# Patient Record
Sex: MALE | Race: ASIAN | NOT HISPANIC OR LATINO | URBAN - METROPOLITAN AREA
[De-identification: names, ages, dates, MRNs, and addresses within clinical notes are randomized per-mention and may not be internally consistent; named-entity substitution may affect disease eponyms.]

---

## 2022-09-20 ENCOUNTER — EMERGENCY (EMERGENCY)
Facility: HOSPITAL | Age: 62
LOS: 0 days | Discharge: HOME | End: 2022-09-21
Attending: STUDENT IN AN ORGANIZED HEALTH CARE EDUCATION/TRAINING PROGRAM
Payer: MEDICAID

## 2022-09-20 VITALS
HEIGHT: 67 IN | HEART RATE: 75 BPM | TEMPERATURE: 98 F | WEIGHT: 160.06 LBS | DIASTOLIC BLOOD PRESSURE: 65 MMHG | RESPIRATION RATE: 16 BRPM | SYSTOLIC BLOOD PRESSURE: 119 MMHG | OXYGEN SATURATION: 96 %

## 2022-09-20 DIAGNOSIS — E11.9 TYPE 2 DIABETES MELLITUS WITHOUT COMPLICATIONS: ICD-10-CM

## 2022-09-20 DIAGNOSIS — E78.5 HYPERLIPIDEMIA, UNSPECIFIED: ICD-10-CM

## 2022-09-20 DIAGNOSIS — R53.1 WEAKNESS: ICD-10-CM

## 2022-09-20 DIAGNOSIS — I10 ESSENTIAL (PRIMARY) HYPERTENSION: ICD-10-CM

## 2022-09-20 DIAGNOSIS — R50.9 FEVER, UNSPECIFIED: ICD-10-CM

## 2022-09-20 DIAGNOSIS — U07.1 COVID-19: ICD-10-CM

## 2022-09-20 DIAGNOSIS — R05.1 ACUTE COUGH: ICD-10-CM

## 2022-09-20 LAB
ALBUMIN SERPL ELPH-MCNC: 3.1 G/DL — LOW (ref 3.3–5)
ALP SERPL-CCNC: 62 U/L — SIGNIFICANT CHANGE UP (ref 40–120)
ALT FLD-CCNC: 20 U/L — SIGNIFICANT CHANGE UP (ref 12–78)
ANION GAP SERPL CALC-SCNC: 7 MMOL/L — SIGNIFICANT CHANGE UP (ref 5–17)
ANISOCYTOSIS BLD QL: SLIGHT — SIGNIFICANT CHANGE UP
APTT BLD: 35.6 SEC — HIGH (ref 27.5–35.5)
AST SERPL-CCNC: 24 U/L — SIGNIFICANT CHANGE UP (ref 15–37)
BASOPHILS # BLD AUTO: 0 K/UL — SIGNIFICANT CHANGE UP (ref 0–0.2)
BASOPHILS NFR BLD AUTO: 0 % — SIGNIFICANT CHANGE UP (ref 0–2)
BILIRUB SERPL-MCNC: 0.6 MG/DL — SIGNIFICANT CHANGE UP (ref 0.2–1.2)
BUN SERPL-MCNC: 37 MG/DL — HIGH (ref 7–23)
CALCIUM SERPL-MCNC: 8.8 MG/DL — SIGNIFICANT CHANGE UP (ref 8.5–10.1)
CHLORIDE SERPL-SCNC: 101 MMOL/L — SIGNIFICANT CHANGE UP (ref 96–108)
CO2 SERPL-SCNC: 27 MMOL/L — SIGNIFICANT CHANGE UP (ref 22–31)
CREAT SERPL-MCNC: 2.28 MG/DL — HIGH (ref 0.5–1.3)
EGFR: 31 ML/MIN/1.73M2 — LOW
EOSINOPHIL # BLD AUTO: 0 K/UL — SIGNIFICANT CHANGE UP (ref 0–0.5)
EOSINOPHIL NFR BLD AUTO: 0 % — SIGNIFICANT CHANGE UP (ref 0–6)
FLUAV AG NPH QL: SIGNIFICANT CHANGE UP
FLUBV AG NPH QL: SIGNIFICANT CHANGE UP
GLUCOSE SERPL-MCNC: 161 MG/DL — HIGH (ref 70–99)
HCT VFR BLD CALC: 37 % — LOW (ref 39–50)
HGB BLD-MCNC: 12.4 G/DL — LOW (ref 13–17)
INR BLD: 1.17 RATIO — HIGH (ref 0.88–1.16)
LACTATE SERPL-SCNC: 1.3 MMOL/L — SIGNIFICANT CHANGE UP (ref 0.7–2)
LYMPHOCYTES # BLD AUTO: 1.98 K/UL — SIGNIFICANT CHANGE UP (ref 1–3.3)
LYMPHOCYTES # BLD AUTO: 21 % — SIGNIFICANT CHANGE UP (ref 13–44)
MANUAL SMEAR VERIFICATION: SIGNIFICANT CHANGE UP
MCHC RBC-ENTMCNC: 28.6 PG — SIGNIFICANT CHANGE UP (ref 27–34)
MCHC RBC-ENTMCNC: 33.5 G/DL — SIGNIFICANT CHANGE UP (ref 32–36)
MCV RBC AUTO: 85.3 FL — SIGNIFICANT CHANGE UP (ref 80–100)
MONOCYTES # BLD AUTO: 1.51 K/UL — HIGH (ref 0–0.9)
MONOCYTES NFR BLD AUTO: 16 % — HIGH (ref 2–14)
NEUTROPHILS # BLD AUTO: 5.48 K/UL — SIGNIFICANT CHANGE UP (ref 1.8–7.4)
NEUTROPHILS NFR BLD AUTO: 50 % — SIGNIFICANT CHANGE UP (ref 43–77)
NEUTS BAND # BLD: 8 % — SIGNIFICANT CHANGE UP (ref 0–8)
NRBC # BLD: 0 /100 — SIGNIFICANT CHANGE UP (ref 0–0)
NRBC # BLD: SIGNIFICANT CHANGE UP /100 WBCS (ref 0–0)
PLAT MORPH BLD: NORMAL — SIGNIFICANT CHANGE UP
PLATELET # BLD AUTO: 182 K/UL — SIGNIFICANT CHANGE UP (ref 150–400)
POTASSIUM SERPL-MCNC: 4.3 MMOL/L — SIGNIFICANT CHANGE UP (ref 3.5–5.3)
POTASSIUM SERPL-SCNC: 4.3 MMOL/L — SIGNIFICANT CHANGE UP (ref 3.5–5.3)
PROT SERPL-MCNC: 7.7 GM/DL — SIGNIFICANT CHANGE UP (ref 6–8.3)
PROTHROM AB SERPL-ACNC: 13.9 SEC — HIGH (ref 10.5–13.4)
RBC # BLD: 4.34 M/UL — SIGNIFICANT CHANGE UP (ref 4.2–5.8)
RBC # FLD: 13.6 % — SIGNIFICANT CHANGE UP (ref 10.3–14.5)
RBC BLD AUTO: ABNORMAL
SARS-COV-2 RNA SPEC QL NAA+PROBE: DETECTED
SODIUM SERPL-SCNC: 135 MMOL/L — SIGNIFICANT CHANGE UP (ref 135–145)
VARIANT LYMPHS # BLD: 5 % — SIGNIFICANT CHANGE UP (ref 0–6)
WBC # BLD: 9.45 K/UL — SIGNIFICANT CHANGE UP (ref 3.8–10.5)
WBC # FLD AUTO: 9.45 K/UL — SIGNIFICANT CHANGE UP (ref 3.8–10.5)

## 2022-09-20 PROCEDURE — 99285 EMERGENCY DEPT VISIT HI MDM: CPT

## 2022-09-20 PROCEDURE — 93970 EXTREMITY STUDY: CPT | Mod: 26

## 2022-09-20 PROCEDURE — 71045 X-RAY EXAM CHEST 1 VIEW: CPT | Mod: 26

## 2022-09-20 PROCEDURE — 93010 ELECTROCARDIOGRAM REPORT: CPT

## 2022-09-20 RX ORDER — SODIUM CHLORIDE 9 MG/ML
1000 INJECTION INTRAMUSCULAR; INTRAVENOUS; SUBCUTANEOUS ONCE
Refills: 0 | Status: COMPLETED | OUTPATIENT
Start: 2022-09-20 | End: 2022-09-20

## 2022-09-20 RX ADMIN — SODIUM CHLORIDE 1000 MILLILITER(S): 9 INJECTION INTRAMUSCULAR; INTRAVENOUS; SUBCUTANEOUS at 22:33

## 2022-09-20 NOTE — ED PROVIDER NOTE - NSICDXPASTMEDICALHX_GEN_ALL_CORE_FT
PAST MEDICAL HISTORY:  DM (diabetes mellitus)     H/O: HTN (hypertension)     HLD (hyperlipidemia)

## 2022-09-20 NOTE — ED ADULT TRIAGE NOTE - CHIEF COMPLAINT QUOTE
Pt c/o pain and tingling for 2 months since he had angiogram on that side Pt states happens after sitting for a long time EMS states he has also had cough and chills none at present

## 2022-09-20 NOTE — ED PROVIDER NOTE - CLINICAL SUMMARY MEDICAL DECISION MAKING FREE TEXT BOX
pt presents today with flu like symptoms x 4 days, found to be covid positive, vitals stable, pt not hypoxic on RA (96%), pt prescribed paxlovid, pts daughter made aware

## 2022-09-20 NOTE — ED ADULT NURSE REASSESSMENT NOTE - NS ED NURSE REASSESS COMMENT FT1
received report from Melony RN. As per Melony, ambulette is scheduled to  pt. endorsed by previous nurse that daughter has been contacted and is available to received pt upon arrival home and aware that pt is discharged.

## 2022-09-20 NOTE — ED PROVIDER NOTE - OBJECTIVE STATEMENT
62 year old male with h/o HTN, DM and HLD presents today c/o four days of cough, subjective fevers, chills and generalized weakness, pts daughter Saran called at [phone] and provided history (pt is Kyrgyz speaking) (-) nausea or vomiting (-) diarrhea (-) chest pain (-) sob +unknown sick contacts, normal appetite

## 2022-09-20 NOTE — ED PROVIDER NOTE - PATIENT PORTAL LINK FT
You can access the FollowMyHealth Patient Portal offered by [facility] by registering at the following website: [email or web address]. By joining [employer]’s FollowMyHealth portal, you will also be able to view your health information using other applications (apps) compatible with our system.

## 2022-09-21 VITALS
SYSTOLIC BLOOD PRESSURE: 147 MMHG | OXYGEN SATURATION: 99 % | RESPIRATION RATE: 18 BRPM | DIASTOLIC BLOOD PRESSURE: 74 MMHG | HEART RATE: 81 BPM

## 2022-09-21 LAB
B CEREUS GROUP DNA BLD POS QL NAA+PROBE: SIGNIFICANT CHANGE UP
GRAM STN FLD: SIGNIFICANT CHANGE UP
METHOD TYPE: SIGNIFICANT CHANGE UP
SPECIMEN SOURCE: SIGNIFICANT CHANGE UP

## 2022-09-22 LAB
CULTURE RESULTS: SIGNIFICANT CHANGE UP
GRAM STN FLD: SIGNIFICANT CHANGE UP
ORGANISM # SPEC MICROSCOPIC CNT: SIGNIFICANT CHANGE UP
ORGANISM # SPEC MICROSCOPIC CNT: SIGNIFICANT CHANGE UP
SPECIMEN SOURCE: SIGNIFICANT CHANGE UP

## 2022-09-25 LAB
CULTURE RESULTS: SIGNIFICANT CHANGE UP
SPECIMEN SOURCE: SIGNIFICANT CHANGE UP

## 2024-05-24 ENCOUNTER — INPATIENT (INPATIENT)
Facility: HOSPITAL | Age: 64
LOS: 4 days | Discharge: HOME HEALTH | DRG: 282 | End: 2024-05-29
Attending: HOSPITALIST | Admitting: HOSPITALIST
Payer: MEDICARE

## 2024-05-24 ENCOUNTER — EMERGENCY (EMERGENCY)
Facility: HOSPITAL | Age: 64
LOS: 0 days | Discharge: ANOTHER HOSPITAL | End: 2024-05-24
Attending: EMERGENCY MEDICINE
Payer: MEDICARE

## 2024-05-24 VITALS
DIASTOLIC BLOOD PRESSURE: 67 MMHG | SYSTOLIC BLOOD PRESSURE: 132 MMHG | OXYGEN SATURATION: 100 % | HEART RATE: 71 BPM | RESPIRATION RATE: 20 BRPM | TEMPERATURE: 98 F

## 2024-05-24 VITALS
HEART RATE: 95 BPM | WEIGHT: 156.09 LBS | TEMPERATURE: 98 F | HEIGHT: 66 IN | SYSTOLIC BLOOD PRESSURE: 125 MMHG | OXYGEN SATURATION: 99 % | DIASTOLIC BLOOD PRESSURE: 63 MMHG | RESPIRATION RATE: 22 BRPM

## 2024-05-24 VITALS
TEMPERATURE: 96 F | SYSTOLIC BLOOD PRESSURE: 144 MMHG | OXYGEN SATURATION: 97 % | RESPIRATION RATE: 24 BRPM | DIASTOLIC BLOOD PRESSURE: 80 MMHG | HEART RATE: 63 BPM

## 2024-05-24 DIAGNOSIS — I21.3 ST ELEVATION (STEMI) MYOCARDIAL INFARCTION OF UNSPECIFIED SITE: ICD-10-CM

## 2024-05-24 DIAGNOSIS — I25.10 ATHEROSCLEROTIC HEART DISEASE OF NATIVE CORONARY ARTERY WITHOUT ANGINA PECTORIS: ICD-10-CM

## 2024-05-24 DIAGNOSIS — R07.89 OTHER CHEST PAIN: ICD-10-CM

## 2024-05-24 DIAGNOSIS — Z98.890 OTHER SPECIFIED POSTPROCEDURAL STATES: Chronic | ICD-10-CM

## 2024-05-24 DIAGNOSIS — R06.02 SHORTNESS OF BREATH: ICD-10-CM

## 2024-05-24 DIAGNOSIS — E78.5 HYPERLIPIDEMIA, UNSPECIFIED: ICD-10-CM

## 2024-05-24 DIAGNOSIS — E11.9 TYPE 2 DIABETES MELLITUS WITHOUT COMPLICATIONS: ICD-10-CM

## 2024-05-24 DIAGNOSIS — I10 ESSENTIAL (PRIMARY) HYPERTENSION: ICD-10-CM

## 2024-05-24 DIAGNOSIS — R09.02 HYPOXEMIA: ICD-10-CM

## 2024-05-24 DIAGNOSIS — Z95.1 PRESENCE OF AORTOCORONARY BYPASS GRAFT: Chronic | ICD-10-CM

## 2024-05-24 DIAGNOSIS — Z88.8 ALLERGY STATUS TO OTHER DRUGS, MEDICAMENTS AND BIOLOGICAL SUBSTANCES STATUS: ICD-10-CM

## 2024-05-24 PROBLEM — Z86.79 PERSONAL HISTORY OF OTHER DISEASES OF THE CIRCULATORY SYSTEM: Chronic | Status: ACTIVE | Noted: 2022-09-20

## 2024-05-24 LAB
A1C WITH ESTIMATED AVERAGE GLUCOSE RESULT: 8.2 % — HIGH (ref 4–5.6)
ACETONE SERPL-MCNC: NEGATIVE — SIGNIFICANT CHANGE UP
ALBUMIN SERPL ELPH-MCNC: 4 G/DL — SIGNIFICANT CHANGE UP (ref 3.3–5)
ALBUMIN SERPL ELPH-MCNC: 4.1 G/DL — SIGNIFICANT CHANGE UP (ref 3.3–5)
ALP SERPL-CCNC: 72 U/L — SIGNIFICANT CHANGE UP (ref 40–120)
ALP SERPL-CCNC: 83 U/L — SIGNIFICANT CHANGE UP (ref 40–120)
ALT FLD-CCNC: 28 U/L — SIGNIFICANT CHANGE UP (ref 10–45)
ALT FLD-CCNC: 29 U/L — SIGNIFICANT CHANGE UP (ref 10–45)
ANION GAP SERPL CALC-SCNC: 13 MMOL/L — SIGNIFICANT CHANGE UP (ref 5–17)
ANION GAP SERPL CALC-SCNC: 14 MMOL/L — SIGNIFICANT CHANGE UP (ref 5–17)
APPEARANCE UR: CLEAR — SIGNIFICANT CHANGE UP
APTT BLD: 34.9 SEC — SIGNIFICANT CHANGE UP (ref 24.5–35.6)
APTT BLD: 55.5 SEC — HIGH (ref 24.5–35.6)
AST SERPL-CCNC: 83 U/L — HIGH (ref 10–40)
AST SERPL-CCNC: 92 U/L — HIGH (ref 10–40)
BASE EXCESS BLDV CALC-SCNC: -4.5 MMOL/L — LOW (ref -2–3)
BASOPHILS # BLD AUTO: 0.02 K/UL — SIGNIFICANT CHANGE UP (ref 0–0.2)
BASOPHILS NFR BLD AUTO: 0.2 % — SIGNIFICANT CHANGE UP (ref 0–2)
BILIRUB SERPL-MCNC: 0.8 MG/DL — SIGNIFICANT CHANGE UP (ref 0.2–1.2)
BILIRUB SERPL-MCNC: 0.8 MG/DL — SIGNIFICANT CHANGE UP (ref 0.2–1.2)
BILIRUB UR-MCNC: NEGATIVE — SIGNIFICANT CHANGE UP
BLD GP AB SCN SERPL QL: NEGATIVE — SIGNIFICANT CHANGE UP
BLOOD GAS COMMENTS, VENOUS: SIGNIFICANT CHANGE UP
BUN SERPL-MCNC: 33 MG/DL — HIGH (ref 7–23)
BUN SERPL-MCNC: 33 MG/DL — HIGH (ref 7–23)
CALCIUM SERPL-MCNC: 10 MG/DL — SIGNIFICANT CHANGE UP (ref 8.4–10.5)
CALCIUM SERPL-MCNC: 10.2 MG/DL — SIGNIFICANT CHANGE UP (ref 8.4–10.5)
CHLORIDE BLDV-SCNC: 101 MMOL/L — SIGNIFICANT CHANGE UP (ref 98–107)
CHLORIDE SERPL-SCNC: 99 MMOL/L — SIGNIFICANT CHANGE UP (ref 96–108)
CHLORIDE SERPL-SCNC: 99 MMOL/L — SIGNIFICANT CHANGE UP (ref 96–108)
CHOLEST SERPL-MCNC: 94 MG/DL — SIGNIFICANT CHANGE UP
CK MB BLD-MCNC: 6.7 % — HIGH (ref 0–3.5)
CK MB CFR SERPL CALC: 41.2 NG/ML — HIGH (ref 0–6.7)
CK SERPL-CCNC: 619 U/L — HIGH (ref 30–200)
CO2 BLDV-SCNC: 22 MMOL/L — SIGNIFICANT CHANGE UP (ref 22–26)
CO2 SERPL-SCNC: 15 MMOL/L — LOW (ref 22–31)
CO2 SERPL-SCNC: 18 MMOL/L — LOW (ref 22–31)
COLOR SPEC: YELLOW — SIGNIFICANT CHANGE UP
CREAT SERPL-MCNC: 1.89 MG/DL — HIGH (ref 0.5–1.3)
CREAT SERPL-MCNC: 1.9 MG/DL — HIGH (ref 0.5–1.3)
DIFF PNL FLD: NEGATIVE — SIGNIFICANT CHANGE UP
EGFR: 38 ML/MIN/1.73M2 — LOW
EGFR: 39 ML/MIN/1.73M2 — LOW
EOSINOPHIL # BLD AUTO: 0.03 K/UL — SIGNIFICANT CHANGE UP (ref 0–0.5)
EOSINOPHIL NFR BLD AUTO: 0.3 % — SIGNIFICANT CHANGE UP (ref 0–6)
ESTIMATED AVERAGE GLUCOSE: 189 MG/DL — HIGH (ref 68–114)
GAS PNL BLDA: SIGNIFICANT CHANGE UP
GAS PNL BLDV: 124 MMOL/L — LOW (ref 136–145)
GAS PNL BLDV: SIGNIFICANT CHANGE UP
GAS PNL BLDV: SIGNIFICANT CHANGE UP
GLUCOSE BLDC GLUCOMTR-MCNC: 165 MG/DL — HIGH (ref 70–99)
GLUCOSE BLDC GLUCOMTR-MCNC: 280 MG/DL — HIGH (ref 70–99)
GLUCOSE BLDC GLUCOMTR-MCNC: 302 MG/DL — HIGH (ref 70–99)
GLUCOSE BLDC GLUCOMTR-MCNC: 353 MG/DL — HIGH (ref 70–99)
GLUCOSE BLDV-MCNC: 403 MG/DL — HIGH (ref 65–95)
GLUCOSE SERPL-MCNC: 270 MG/DL — HIGH (ref 70–99)
GLUCOSE SERPL-MCNC: 349 MG/DL — HIGH (ref 70–99)
GLUCOSE UR QL: 500 MG/DL
HCO3 BLDV-SCNC: 21 MMOL/L — LOW (ref 22–28)
HCT VFR BLD CALC: 38.6 % — LOW (ref 39–50)
HCT VFR BLD CALC: 38.9 % — LOW (ref 39–50)
HCT VFR BLDA CALC: 43 % — SIGNIFICANT CHANGE UP (ref 37–47)
HDLC SERPL-MCNC: 39 MG/DL — LOW
HGB BLD CALC-MCNC: 14.2 G/DL — SIGNIFICANT CHANGE UP (ref 12.6–17.4)
HGB BLD-MCNC: 13.8 G/DL — SIGNIFICANT CHANGE UP (ref 13–17)
HGB BLD-MCNC: 14 G/DL — SIGNIFICANT CHANGE UP (ref 13–17)
HOROWITZ INDEX BLDV+IHG-RTO: 100 — SIGNIFICANT CHANGE UP
IMM GRANULOCYTES NFR BLD AUTO: 0.7 % — SIGNIFICANT CHANGE UP (ref 0–0.9)
INR BLD: 0.86 RATIO — SIGNIFICANT CHANGE UP (ref 0.85–1.18)
KETONES UR-MCNC: NEGATIVE MG/DL — SIGNIFICANT CHANGE UP
LACTATE BLDV-MCNC: 2.4 MMOL/L — HIGH (ref 0.56–1.39)
LEUKOCYTE ESTERASE UR-ACNC: NEGATIVE — SIGNIFICANT CHANGE UP
LIPID PNL WITH DIRECT LDL SERPL: 38 MG/DL — SIGNIFICANT CHANGE UP
LYMPHOCYTES # BLD AUTO: 1.35 K/UL — SIGNIFICANT CHANGE UP (ref 1–3.3)
LYMPHOCYTES # BLD AUTO: 11.7 % — LOW (ref 13–44)
MAGNESIUM SERPL-MCNC: 2.2 MG/DL — SIGNIFICANT CHANGE UP (ref 1.6–2.6)
MAGNESIUM SERPL-MCNC: 2.3 MG/DL — SIGNIFICANT CHANGE UP (ref 1.6–2.6)
MCHC RBC-ENTMCNC: 29.3 PG — SIGNIFICANT CHANGE UP (ref 27–34)
MCHC RBC-ENTMCNC: 29.7 PG — SIGNIFICANT CHANGE UP (ref 27–34)
MCHC RBC-ENTMCNC: 35.8 GM/DL — SIGNIFICANT CHANGE UP (ref 32–36)
MCHC RBC-ENTMCNC: 36 G/DL — SIGNIFICANT CHANGE UP (ref 32–36)
MCV RBC AUTO: 82 FL — SIGNIFICANT CHANGE UP (ref 80–100)
MCV RBC AUTO: 82.4 FL — SIGNIFICANT CHANGE UP (ref 80–100)
MONOCYTES # BLD AUTO: 1.24 K/UL — HIGH (ref 0–0.9)
MONOCYTES NFR BLD AUTO: 10.8 % — SIGNIFICANT CHANGE UP (ref 2–14)
NEUTROPHILS # BLD AUTO: 8.79 K/UL — HIGH (ref 1.8–7.4)
NEUTROPHILS NFR BLD AUTO: 76.3 % — SIGNIFICANT CHANGE UP (ref 43–77)
NITRITE UR-MCNC: NEGATIVE — SIGNIFICANT CHANGE UP
NON HDL CHOLESTEROL: 55 MG/DL — SIGNIFICANT CHANGE UP
NRBC # BLD: 0 /100 WBCS — SIGNIFICANT CHANGE UP (ref 0–0)
NRBC # BLD: 0 /100 WBCS — SIGNIFICANT CHANGE UP (ref 0–0)
NT-PROBNP SERPL-SCNC: 4188 PG/ML — HIGH (ref 0–300)
PCO2 BLDV: 40 MMHG — LOW (ref 42–55)
PH BLDV: 7.33 — SIGNIFICANT CHANGE UP (ref 7.32–7.43)
PH UR: 5 — SIGNIFICANT CHANGE UP (ref 5–8)
PHOSPHATE SERPL-MCNC: 2.8 MG/DL — SIGNIFICANT CHANGE UP (ref 2.5–4.5)
PHOSPHATE SERPL-MCNC: 3.1 MG/DL — SIGNIFICANT CHANGE UP (ref 2.5–4.5)
PLATELET # BLD AUTO: 167 K/UL — SIGNIFICANT CHANGE UP (ref 150–400)
PLATELET # BLD AUTO: 168 K/UL — SIGNIFICANT CHANGE UP (ref 150–400)
PO2 BLDV: 36 MMHG — SIGNIFICANT CHANGE UP (ref 25–45)
POTASSIUM BLDV-SCNC: 6.5 MMOL/L — CRITICAL HIGH (ref 3.5–5.1)
POTASSIUM SERPL-MCNC: 4.6 MMOL/L — SIGNIFICANT CHANGE UP (ref 3.5–5.3)
POTASSIUM SERPL-MCNC: 5.8 MMOL/L — HIGH (ref 3.5–5.3)
POTASSIUM SERPL-SCNC: 4.6 MMOL/L — SIGNIFICANT CHANGE UP (ref 3.5–5.3)
POTASSIUM SERPL-SCNC: 5.8 MMOL/L — HIGH (ref 3.5–5.3)
PROT SERPL-MCNC: 7.8 G/DL — SIGNIFICANT CHANGE UP (ref 6–8.3)
PROT SERPL-MCNC: 8 G/DL — SIGNIFICANT CHANGE UP (ref 6–8.3)
PROT UR-MCNC: SIGNIFICANT CHANGE UP MG/DL
PROTHROM AB SERPL-ACNC: 10.3 SEC — SIGNIFICANT CHANGE UP (ref 9.5–13)
RBC # BLD: 4.71 M/UL — SIGNIFICANT CHANGE UP (ref 4.2–5.8)
RBC # BLD: 4.72 M/UL — SIGNIFICANT CHANGE UP (ref 4.2–5.8)
RBC # FLD: 13 % — SIGNIFICANT CHANGE UP (ref 10.3–14.5)
RBC # FLD: 13.2 % — SIGNIFICANT CHANGE UP (ref 10.3–14.5)
RH IG SCN BLD-IMP: POSITIVE — SIGNIFICANT CHANGE UP
SAO2 % BLDV: 56.9 % — LOW (ref 94–98)
SODIUM SERPL-SCNC: 128 MMOL/L — LOW (ref 135–145)
SODIUM SERPL-SCNC: 130 MMOL/L — LOW (ref 135–145)
SP GR SPEC: 1.01 — SIGNIFICANT CHANGE UP (ref 1–1.03)
TRIGL SERPL-MCNC: 84 MG/DL — SIGNIFICANT CHANGE UP
TROPONIN T, HIGH SENSITIVITY RESULT: 1978 NG/L — HIGH (ref 0–51)
UROBILINOGEN FLD QL: 0.2 MG/DL — SIGNIFICANT CHANGE UP (ref 0.2–1)
WBC # BLD: 11.38 K/UL — HIGH (ref 3.8–10.5)
WBC # BLD: 11.51 K/UL — HIGH (ref 3.8–10.5)
WBC # FLD AUTO: 11.38 K/UL — HIGH (ref 3.8–10.5)
WBC # FLD AUTO: 11.51 K/UL — HIGH (ref 3.8–10.5)

## 2024-05-24 PROCEDURE — 99291 CRITICAL CARE FIRST HOUR: CPT

## 2024-05-24 PROCEDURE — 93308 TTE F-UP OR LMTD: CPT | Mod: 26

## 2024-05-24 PROCEDURE — 93459 L HRT ART/GRFT ANGIO: CPT | Mod: 26

## 2024-05-24 PROCEDURE — 71045 X-RAY EXAM CHEST 1 VIEW: CPT | Mod: 26

## 2024-05-24 PROCEDURE — 99292 CRITICAL CARE ADDL 30 MIN: CPT

## 2024-05-24 PROCEDURE — 93325 DOPPLER ECHO COLOR FLOW MAPG: CPT | Mod: 26

## 2024-05-24 PROCEDURE — 93010 ELECTROCARDIOGRAM REPORT: CPT

## 2024-05-24 PROCEDURE — 71045 X-RAY EXAM CHEST 1 VIEW: CPT | Mod: 26,77

## 2024-05-24 PROCEDURE — 99152 MOD SED SAME PHYS/QHP 5/>YRS: CPT

## 2024-05-24 RX ORDER — GLUCAGON INJECTION, SOLUTION 0.5 MG/.1ML
1 INJECTION, SOLUTION SUBCUTANEOUS ONCE
Refills: 0 | Status: DISCONTINUED | OUTPATIENT
Start: 2024-05-24 | End: 2024-05-24

## 2024-05-24 RX ORDER — FUROSEMIDE 40 MG
40 TABLET ORAL ONCE
Refills: 0 | Status: DISCONTINUED | OUTPATIENT
Start: 2024-05-24 | End: 2024-05-24

## 2024-05-24 RX ORDER — HEPARIN SODIUM 5000 [USP'U]/ML
INJECTION INTRAVENOUS; SUBCUTANEOUS
Qty: 25000 | Refills: 0 | Status: DISCONTINUED | OUTPATIENT
Start: 2024-05-24 | End: 2024-05-24

## 2024-05-24 RX ORDER — DEXTROSE 50 % IN WATER 50 %
25 SYRINGE (ML) INTRAVENOUS ONCE
Refills: 0 | Status: DISCONTINUED | OUTPATIENT
Start: 2024-05-24 | End: 2024-05-24

## 2024-05-24 RX ORDER — TICAGRELOR 90 MG/1
90 TABLET ORAL EVERY 12 HOURS
Refills: 0 | Status: DISCONTINUED | OUTPATIENT
Start: 2024-05-24 | End: 2024-05-24

## 2024-05-24 RX ORDER — ASPIRIN/CALCIUM CARB/MAGNESIUM 324 MG
81 TABLET ORAL DAILY
Refills: 0 | Status: DISCONTINUED | OUTPATIENT
Start: 2024-05-24 | End: 2024-05-24

## 2024-05-24 RX ORDER — FUROSEMIDE 40 MG
60 TABLET ORAL ONCE
Refills: 0 | Status: COMPLETED | OUTPATIENT
Start: 2024-05-24 | End: 2024-05-24

## 2024-05-24 RX ORDER — SODIUM CHLORIDE 9 MG/ML
1000 INJECTION, SOLUTION INTRAVENOUS
Refills: 0 | Status: DISCONTINUED | OUTPATIENT
Start: 2024-05-24 | End: 2024-05-24

## 2024-05-24 RX ORDER — DEXTROSE 50 % IN WATER 50 %
12.5 SYRINGE (ML) INTRAVENOUS ONCE
Refills: 0 | Status: DISCONTINUED | OUTPATIENT
Start: 2024-05-24 | End: 2024-05-24

## 2024-05-24 RX ORDER — TICAGRELOR 90 MG/1
90 TABLET ORAL EVERY 12 HOURS
Refills: 0 | Status: DISCONTINUED | OUTPATIENT
Start: 2024-05-25 | End: 2024-05-29

## 2024-05-24 RX ORDER — DEXTROSE 10 % IN WATER 10 %
125 INTRAVENOUS SOLUTION INTRAVENOUS ONCE
Refills: 0 | Status: DISCONTINUED | OUTPATIENT
Start: 2024-05-24 | End: 2024-05-24

## 2024-05-24 RX ORDER — ATORVASTATIN CALCIUM 80 MG/1
80 TABLET, FILM COATED ORAL AT BEDTIME
Refills: 0 | Status: DISCONTINUED | OUTPATIENT
Start: 2024-05-24 | End: 2024-05-29

## 2024-05-24 RX ORDER — ASPIRIN/CALCIUM CARB/MAGNESIUM 324 MG
81 TABLET ORAL DAILY
Refills: 0 | Status: DISCONTINUED | OUTPATIENT
Start: 2024-05-25 | End: 2024-05-29

## 2024-05-24 RX ORDER — CLOPIDOGREL BISULFATE 75 MG/1
75 TABLET, FILM COATED ORAL DAILY
Refills: 0 | Status: DISCONTINUED | OUTPATIENT
Start: 2024-05-24 | End: 2024-05-24

## 2024-05-24 RX ORDER — HEPARIN SODIUM 5000 [USP'U]/ML
900 INJECTION INTRAVENOUS; SUBCUTANEOUS
Qty: 25000 | Refills: 0 | Status: DISCONTINUED | OUTPATIENT
Start: 2024-05-24 | End: 2024-05-24

## 2024-05-24 RX ORDER — HUMAN INSULIN 100 [IU]/ML
10 INJECTION, SUSPENSION SUBCUTANEOUS ONCE
Refills: 0 | Status: COMPLETED | OUTPATIENT
Start: 2024-05-24 | End: 2024-05-24

## 2024-05-24 RX ORDER — INSULIN LISPRO 100/ML
VIAL (ML) SUBCUTANEOUS EVERY 6 HOURS
Refills: 0 | Status: DISCONTINUED | OUTPATIENT
Start: 2024-05-24 | End: 2024-05-24

## 2024-05-24 RX ORDER — CILOSTAZOL 100 MG/1
100 TABLET ORAL
Refills: 0 | Status: DISCONTINUED | OUTPATIENT
Start: 2024-05-24 | End: 2024-05-26

## 2024-05-24 RX ORDER — HEPARIN SODIUM 5000 [USP'U]/ML
4000 INJECTION INTRAVENOUS; SUBCUTANEOUS EVERY 6 HOURS
Refills: 0 | Status: DISCONTINUED | OUTPATIENT
Start: 2024-05-24 | End: 2024-05-24

## 2024-05-24 RX ORDER — CALCIUM GLUCONATE 100 MG/ML
2 VIAL (ML) INTRAVENOUS ONCE
Refills: 0 | Status: COMPLETED | OUTPATIENT
Start: 2024-05-24 | End: 2024-05-24

## 2024-05-24 RX ORDER — INSULIN LISPRO 100/ML
VIAL (ML) SUBCUTANEOUS
Refills: 0 | Status: DISCONTINUED | OUTPATIENT
Start: 2024-05-24 | End: 2024-05-27

## 2024-05-24 RX ORDER — DEXTROSE 50 % IN WATER 50 %
15 SYRINGE (ML) INTRAVENOUS ONCE
Refills: 0 | Status: DISCONTINUED | OUTPATIENT
Start: 2024-05-24 | End: 2024-05-24

## 2024-05-24 RX ORDER — INSULIN LISPRO 100/ML
VIAL (ML) SUBCUTANEOUS AT BEDTIME
Refills: 0 | Status: DISCONTINUED | OUTPATIENT
Start: 2024-05-24 | End: 2024-05-29

## 2024-05-24 RX ORDER — ASPIRIN/CALCIUM CARB/MAGNESIUM 324 MG
162 TABLET ORAL ONCE
Refills: 0 | Status: COMPLETED | OUTPATIENT
Start: 2024-05-24 | End: 2024-05-24

## 2024-05-24 RX ORDER — FUROSEMIDE 40 MG
40 TABLET ORAL DAILY
Refills: 0 | Status: DISCONTINUED | OUTPATIENT
Start: 2024-05-24 | End: 2024-05-29

## 2024-05-24 RX ORDER — LEVOTHYROXINE SODIUM 125 MCG
100 TABLET ORAL DAILY
Refills: 0 | Status: DISCONTINUED | OUTPATIENT
Start: 2024-05-24 | End: 2024-05-29

## 2024-05-24 RX ORDER — TICAGRELOR 90 MG/1
180 TABLET ORAL ONCE
Refills: 0 | Status: COMPLETED | OUTPATIENT
Start: 2024-05-24 | End: 2024-05-24

## 2024-05-24 RX ORDER — INSULIN GLARGINE 100 [IU]/ML
20 INJECTION, SOLUTION SUBCUTANEOUS AT BEDTIME
Refills: 0 | Status: DISCONTINUED | OUTPATIENT
Start: 2024-05-24 | End: 2024-05-25

## 2024-05-24 RX ORDER — LIDOCAINE HCL 20 MG/ML
10 VIAL (ML) INJECTION ONCE
Refills: 0 | Status: COMPLETED | OUTPATIENT
Start: 2024-05-24 | End: 2024-05-24

## 2024-05-24 RX ORDER — INSULIN LISPRO 100/ML
6 VIAL (ML) SUBCUTANEOUS ONCE
Refills: 0 | Status: COMPLETED | OUTPATIENT
Start: 2024-05-24 | End: 2024-05-24

## 2024-05-24 RX ORDER — HEPARIN SODIUM 5000 [USP'U]/ML
900 INJECTION INTRAVENOUS; SUBCUTANEOUS
Qty: 25000 | Refills: 0 | Status: DISCONTINUED | OUTPATIENT
Start: 2024-05-24 | End: 2024-05-25

## 2024-05-24 RX ADMIN — INSULIN GLARGINE 20 UNIT(S): 100 INJECTION, SOLUTION SUBCUTANEOUS at 22:42

## 2024-05-24 RX ADMIN — Medication 162 MILLIGRAM(S): at 10:16

## 2024-05-24 RX ADMIN — Medication 162 MILLIGRAM(S): at 10:50

## 2024-05-24 RX ADMIN — Medication 60 MILLIGRAM(S): at 12:32

## 2024-05-24 RX ADMIN — HEPARIN SODIUM 9 UNIT(S)/HR: 5000 INJECTION INTRAVENOUS; SUBCUTANEOUS at 13:46

## 2024-05-24 RX ADMIN — Medication 3: at 22:42

## 2024-05-24 RX ADMIN — HUMAN INSULIN 10 UNIT(S): 100 INJECTION, SUSPENSION SUBCUTANEOUS at 13:16

## 2024-05-24 RX ADMIN — TICAGRELOR 90 MILLIGRAM(S): 90 TABLET ORAL at 17:38

## 2024-05-24 RX ADMIN — ATORVASTATIN CALCIUM 80 MILLIGRAM(S): 80 TABLET, FILM COATED ORAL at 22:42

## 2024-05-24 RX ADMIN — Medication 6 UNIT(S): at 12:32

## 2024-05-24 RX ADMIN — TICAGRELOR 180 MILLIGRAM(S): 90 TABLET ORAL at 10:38

## 2024-05-24 RX ADMIN — CILOSTAZOL 100 MILLIGRAM(S): 100 TABLET ORAL at 17:39

## 2024-05-24 RX ADMIN — HEPARIN SODIUM 900 UNIT(S)/HR: 5000 INJECTION INTRAVENOUS; SUBCUTANEOUS at 10:41

## 2024-05-24 RX ADMIN — Medication 200 GRAM(S): at 10:50

## 2024-05-24 RX ADMIN — Medication 5 MILLILITER(S): at 22:43

## 2024-05-24 RX ADMIN — Medication 1: at 17:36

## 2024-05-24 NOTE — ED ADULT NURSE NOTE - NSFALLUNIVINTERV_ED_ALL_ED
Bed/Stretcher in lowest position, wheels locked, appropriate side rails in place/Call bell, personal items and telephone in reach/Instruct patient to call for assistance before getting out of bed/chair/stretcher/Non-slip footwear applied when patient is off stretcher/[place] to call system/Physically safe environment - no spills, clutter or unnecessary equipment/Purposeful proactive rounding/Room/bathroom lighting operational, light cord in reach

## 2024-05-24 NOTE — PROGRESS NOTE ADULT - ASSESSMENT
63y/o M PMHx CABG in 2018, PCI x3 prior to 2018, CKD3, HTN, DM, HLD, presenting with chest pain and shortness of breathing. EKG showed STEMI pathology, patient transferred to cath lab for C, unable to tolerate lying flat. Patient transferred to CICU for further monitoring.     NEURO:  #Mental status: baseline   -Currently A&O x 3    CV:  #STEMI (+EKG changes): Typical/atypical symptoms, loaded with 325 mg ASA, 180mg Ticagrelor, heparin bolus   - Revascularization (within 48hrs of symptoms) with PCI, unable to lie flat. Will optimize respiratory status and reach out to interventional cardiology for reattempt   - DAPT: ASA 81, Ticagrelor 90mg BID   - Statin: atorvastatin 80mg qD   - Heparin gtt   - TTE 5/24:   - Obtain cardiac enzymes     PULM:  #Pulmonary edema   Patient appears volume overloaded   - Aggressive diuresis with Lasix     RENAL:  #TRANG: Elevated Cr near baseline of 2.2   - Place lu   - Monitor I/Os     #Hyperkalemia   - Shift as needed, trial Lokelma when able to tolerate off BiPAP     GI:  #Diet: NPO while on BiPAP. Trial weaning onto HFNC when able     #Elevated AST   Likely 2/2 HF   - Continue to monitor     ENDO:  #DM2: Send HbA1c   - Insulin Sliding Scale   - Lantus 20u qHS     HEMATOLOGIC:  #DVT prophylaxis with heparin gtt     #Mild leukocytosis   Likely 2/2 ACS and heart failure   - Monitor off abx   - Continue to trend     ID:  #Pt without strong objective or clinical evidence of infection. Will observe off antibiotics     SKIN:  #Lines: Lu (5/24-)   Peripherals     #R foot nail falling off   - Podiatry c/s when patient more stable      65y/o M PMHx CABG in 2018, PCI x3 prior to 2018, CKD3, HTN, DM, HLD, presenting with chest pain and shortness of breathing. EKG showed STEMI pathology, patient transferred to cath lab for LHC, unable to tolerate lying flat. Patient transferred to CICU for further monitoring.     ====NEURO====  #Mental status: baseline   -Currently A&O x 3    ====PULM====  #Pulmonary edema   Patient appears volume overloaded   - Aggressive diuresis with Lasix   - Respiratory status improved, weaned off of BiPAP    ====CARDIOVASCULAR====  #STEMI (+EKG changes): Typical/atypical symptoms, loaded with 325 mg ASA, 180mg Ticagrelor, heparin bolus   - Revascularization (within 48hrs of symptoms) with PCI, unable to lie flat. Will optimize respiratory status and reach out to interventional cardiology for reattempt   - DAPT: ASA 81, Ticagrelor 90mg BID   - LHC: patent grafts, OM occlusion  - Statin: atorvastatin 80mg qD   - Heparin gtt   - TTE 5/24: EF 59%, no RWMAs, nml RVSF    ====RENAL====  #TRANG: Elevated Cr near baseline of 2.2   - Place lu   - Monitor I/Os   - Avoid nephrotoxic medications    #Hyperkalemia   - Shift as needed, trial Lokelma when able to tolerate off BiPAP     ====GI====  #Diet: NPO while on BiPAP. Trial weaning onto HFNC when able     #Elevated AST   Likely 2/2 HF   - Continue to monitor     ====ENDO====  #DM2: Send HbA1c   - Insulin Sliding Scale   - Lantus 20u qHS     ====HEME/ONC===  #DVT prophylaxis with heparin gtt   - Transition to SQH  - H/H and platelets stable    ====ID====  #Mild leukocytosis   Likely 2/2 ACS and heart failure   - Monitor off abx   - Continue to trend     ====LINES====   Lu (5/24-)   Peripherals     #R foot nail falling off   - Podiatry c/s when patient more stable     ======================= DISPOSITION  =====================  [X] Critical   [ ] Guarded    [ ] Stable    [X] Maintain in CICU  [ ] Downgrade to Telemtry  [ ] Discharge Home    Patient requires continuous monitoring with bedside rhythm monitoring, pulse ox monitoring, and intermittent blood gas analysis. Care plan discussed with ICU care team. Patient remained critical and at risk for life threatening decompensation.  Patient seen, examined and plan discussed with CCU team during rounds.     I have personally provided 35 minutes of critical care time excluding time spent on separate procedures, in addition to initial critical care time provided by the CICU Attending, Dr. Le

## 2024-05-24 NOTE — H&P ADULT - NSICDXPASTMEDICALHX_GEN_ALL_CORE_FT
PAST MEDICAL HISTORY:  CAD (coronary artery disease)     HTN (hypertension)     Hyperlipidemia     PAD (peripheral artery disease)     Stage 3 chronic kidney disease     Type 2 diabetes mellitus

## 2024-05-24 NOTE — ED ADULT NURSE REASSESSMENT NOTE - NS ED NURSE REASSESS COMMENT FT1
STEMI noted on EKG.  immediately made aware. 2nd large bore IV access obtained. Heparin infusion and aspirin/brilinta given to pt as ordered. Maintained on cardiac monitor

## 2024-05-24 NOTE — ED PROVIDER NOTE - CLINICAL SUMMARY MEDICAL DECISION MAKING FREE TEXT BOX
Attending note (Hunter): Six 4-year-old male history HTN DM HLD and possible heart failure is on typically Lasix 40 mg daily presenting with worsening chest pain shortness of breath this morning after awakening brought in by EMS and CPAP.  Patient with diffuse rales and wheezing and appearing volume overloaded.  EKG showing evidence of ST elevation MI.  Decision for transfer made and case discussed with cardiology attending at [facility], accepted for transfer starting on heparin drip continuing BiPAP.

## 2024-05-24 NOTE — H&P ADULT - ASSESSMENT
65y/o M with h/o HTN, DM, HLD, presenting with chest pain and shortness of breathing. EKG showed STEMI pathology, patient transferred to cath lab for [facility], unable to tolerate lying flat. Patient transferred to CICU for further monitoring.     NEURO:  #Mental status: baseline   -Currently A&O x 3    CV:  #STEMI (+EKG changes): Typical/atypical symptoms, loaded with 325 mg ASA, 180mg Ticagrelor, heparin bolus   - Revascularization (within 48hrs of symptoms) with PCI   - DAPT: ASA 81, Ticagrelor 90mg BID for 1 year   - Statin: atorvastatin 80mg qD   - Heparin gtt   - Stat TTE       PULM:  #Pulmonary edema   Patient appears volume overloaded   - Diuresis with Lasix     RENAL:  #TRANG: Elevated Cr   - Place lu   - Urine studies     #Hyperkalemia   - Shift as needed, trial Lokelma when able to tolerate off BiPAP     GI:  #Diet: NPO while on BiPAP     ENDO:  #DM2: Send HbA1c   - Insulin Sliding Scale      HEMATOLOGIC:  #DVT prophylaxis with heparin gtt     ID:  #Pt without strong objective or clinical evidence of infection. Will observe off antibiotics    SKIN:  #Lines:         63y/o M PMHx CABG in 2018, PCI x3 prior to 2018, CKD3, HTN, DM, HLD, presenting with chest pain and shortness of breathing. EKG showed STEMI pathology, patient transferred to cath lab for C, unable to tolerate lying flat. Patient transferred to CICU for further monitoring.     NEURO:  #Mental status: baseline   -Currently A&O x 3    CV:  #STEMI (+EKG changes): Typical/atypical symptoms, loaded with 325 mg ASA, 180mg Ticagrelor, heparin bolus   - Revascularization (within 48hrs of symptoms) with PCI, unable to lie flat. Will optimize respiratory status and reach out to interventional cardiology for reattempt   - DAPT: ASA 81, Ticagrelor 90mg BID for 1 year   - Statin: atorvastatin 80mg qD   - Heparin gtt   - Stat TTE   - Obtain cardiac enzymes     PULM:  #Pulmonary edema   Patient appears volume overloaded   - Aggressive diuresis with Lasix     RENAL:  #TRANG: Elevated Cr near baseline of 2.2   - Place lu   - Monitor I/Os     #Hyperkalemia   - Shift as needed, trial Lokelma when able to tolerate off BiPAP     GI:  #Diet: NPO while on BiPAP. Trial weaning onto HFNC when able     #Elevated AST   Likely 2/2 HF   - Continue to monitor     ENDO:  #DM2: Send HbA1c   - Insulin Sliding Scale   - Lantus 20u qHS     HEMATOLOGIC:  #DVT prophylaxis with heparin gtt     #Mild leukocytosis   Likely 2/2 ACS and heart failure   - Monitor off abx   - Continue to trend     ID:  #Pt without strong objective or clinical evidence of infection. Will observe off antibiotics     SKIN:  #Lines: Lu (5/24-)   Peripherals    63y/o M PMHx CABG in 2018, PCI x3 prior to 2018, CKD3, HTN, DM, HLD, presenting with chest pain and shortness of breathing. EKG showed STEMI pathology, patient transferred to cath lab for C, unable to tolerate lying flat. Patient transferred to CICU for further monitoring.     NEURO:  #Mental status: baseline   -Currently A&O x 3    CV:  #STEMI (+EKG changes): Typical/atypical symptoms, loaded with 325 mg ASA, 180mg Ticagrelor, heparin bolus   - Revascularization (within 48hrs of symptoms) with PCI, unable to lie flat. Will optimize respiratory status and reach out to interventional cardiology for reattempt   - DAPT: ASA 81, Ticagrelor 90mg BID for 1 year   - Statin: atorvastatin 80mg qD   - Heparin gtt   - Stat TTE   - Obtain cardiac enzymes     PULM:  #Pulmonary edema   Patient appears volume overloaded   - Aggressive diuresis with Lasix     RENAL:  #TRANG: Elevated Cr near baseline of 2.2   - Place lu   - Monitor I/Os     #Hyperkalemia   - Shift as needed, trial Lokelma when able to tolerate off BiPAP     GI:  #Diet: NPO while on BiPAP. Trial weaning onto HFNC when able     #Elevated AST   Likely 2/2 HF   - Continue to monitor     ENDO:  #DM2: Send HbA1c   - Insulin Sliding Scale   - Lantus 20u qHS     HEMATOLOGIC:  #DVT prophylaxis with heparin gtt     #Mild leukocytosis   Likely 2/2 ACS and heart failure   - Monitor off abx   - Continue to trend     ID:  #Pt without strong objective or clinical evidence of infection. Will observe off antibiotics     SKIN:  #Lines: Lu (5/24-)   Peripherals     #R foot nail falling off   - Podiatry c/s when patient more stable

## 2024-05-24 NOTE — ED ADULT NURSE NOTE - OBJECTIVE STATEMENT
64 yr old male AOx4. C/o difficulty breathing since last night. BIBA on CPAP. 100% O2 on CPAP. Reports being unable to obtain lasix and insulin from pharmacy x4 days. B/L leg edema noted. Lungs sounds diminished. Reports improvement in breathing on CPAP. Reports chronic 5/10 midsternal CP since bypass surgery in 2018. PMH of DM, HTN, HLD, CAD, and CHF. Resp called to prepare BIPAP for pt 64 yr old male AOx4. C/o difficulty breathing since last night. BIBA on CPAP. 100% O2 on CPAP. Reports being unable to obtain lasix and insulin from pharmacy x4 days. B/L leg edema noted. Lungs sounds diminished. Reports improvement in breathing on CPAP. Reports chronic 5/10 midsternal CP since bypass surgery in 2018. PMH of DM, HTN, HLD, CAD, and CHF. Resp called to prepare BIPAP for pt. Pt is Latvian speaking, denies  services, daughter at bedside acting as

## 2024-05-24 NOTE — H&P ADULT - NSHPPHYSICALEXAM_GEN_ALL_CORE
VITALS:   T(C): 35.4 (05-24-24 @ 11:48), Max: 35.4 (05-24-24 @ 11:48)  HR: 63 (05-24-24 @ 11:48) (63 - 63)  BP: 144/80 (05-24-24 @ 11:48) (144/80 - 144/80)  RR: 24 (05-24-24 @ 11:48) (24 - 24)  SpO2: 97% (05-24-24 @ 11:48) (97% - 97%)    GENERAL: NAD, lying in bed comfortably  HEAD:  Atraumatic, normocephalic  EYES: EOMI, PERRLA, conjunctiva and sclera clear  ENT: Moist mucous membranes  NECK: Supple, no JVD  HEART: Regular rate and rhythm, no murmurs, rubs, or gallops  LUNGS: Unlabored respirations.  Clear to auscultation bilaterally, no crackles, wheezing, or rhonchi  ABDOMEN: Soft, nontender, nondistended, +BS  EXTREMITIES: 2+ peripheral pulses bilaterally. No clubbing, cyanosis, or edema  NERVOUS SYSTEM:  A&Ox3, no focal deficits   SKIN: No rashes or lesions VITALS:   T(C): 35.4 (05-24-24 @ 11:48), Max: 35.4 (05-24-24 @ 11:48)  HR: 63 (05-24-24 @ 11:48) (63 - 63)  BP: 144/80 (05-24-24 @ 11:48) (144/80 - 144/80)  RR: 24 (05-24-24 @ 11:48) (24 - 24)  SpO2: 97% (05-24-24 @ 11:48) (97% - 97%)    GENERAL: NAD, lying in bed comfortably  HEAD:  Atraumatic, normocephalic  EYES: EOMI, PERRLA, conjunctiva and sclera clear  ENT: Moist mucous membranes  NECK: Supple, no JVD  HEART: Regular rate and rhythm, no murmurs, rubs, or gallops  LUNGS: Unlabored respirations.  Clear to auscultation bilaterally, no crackles, wheezing, or rhonchi  ABDOMEN: Soft, nontender, nondistended   EXTREMITIES: 2+ peripheral pulses bilaterally. + B/l LE edema  NERVOUS SYSTEM:  A&Ox3, no focal deficits   SKIN: No rashes or lesions

## 2024-05-24 NOTE — H&P ADULT - HISTORY OF PRESENT ILLNESS
65y/o M with h/o HTN, DM, HLD, presenting with chest pain and shortness of breathing. EMS started patient on CPAP for hypoxia and increased WOB, and gave sublingual nitroglycerin x 2 and IV push lasix 60mg. Per ED note, patient with diffuse rales and wheezing and appearing volume overloaded. Patient received ASA, Brilinta, started on heparin gtt, and given 2g calcium due to elevated K+ on VBG. Patient transferred to [facility] for cardiac catheterization.   In cath lab, patient was evaluated, unable to tolerate lying flat and unable to be off BiPAP due to desaturation and tachycardia. Patient then transferred to CICU.   In CICU, patient alert, responsive, on BiPAP. Fs found to be >360, given 6u Admelog.   In [facility], labs notable for ProBNP > ~3800 63y/o M PMHx CABG in 2018, PCI x3 prior to 2018, CKD3, HTN, DM, HLD, presenting with chest pain and shortness of breathing. Patient BIBEMS to [facility]. EMS started patient on CPAP for hypoxia and increased WOB, and gave sublingual nitroglycerin x 2 and IV push lasix 60mg. Per ED note, patient with diffuse rales and wheezing and appearing volume overloaded. Patient received ASA, Brilinta, started on heparin gtt, and given 2g calcium due to elevated K+ on VBG. Patient transferred to [facility] for cardiac catheterization.   In cath lab, patient was evaluated, unable to tolerate lying flat and unable to be off BiPAP due to desaturation and tachycardia. Patient then transferred to CICU.   In CICU, patient alert, responsive, on BiPAP. Fs found to be >360, given 6u Admelog.   In [facility], labs notable for ProBNP > ~3800, K+ 6.5, AST 78, Cr 2.24, Lact 2.4. CXR shows evidence of pulmonary edema.

## 2024-05-24 NOTE — PATIENT PROFILE ADULT - FALL HARM RISK - HARM RISK INTERVENTIONS

## 2024-05-24 NOTE — PROGRESS NOTE ADULT - SUBJECTIVE AND OBJECTIVE BOX
DAQUAN WEST  MRN-[id number]  Patient is a 64y old  Male who presents with a chief complaint of STEMI (24 May 2024 12:13)    HPI:  65y/o M PMHx CABG in 2018, PCI x3 prior to 2018, CKD3, HTN, DM, HLD, presenting with chest pain and shortness of breathing. Patient BIBEMS to [facility]. EMS started patient on CPAP for hypoxia and increased WOB, and gave sublingual nitroglycerin x 2 and IV push lasix 60mg. Per ED note, patient with diffuse rales and wheezing and appearing volume overloaded. Patient received ASA, Brilinta, started on heparin gtt, and given 2g calcium due to elevated K+ on VBG. Patient transferred to [facility] for cardiac catheterization.   In cath lab, patient was evaluated, unable to tolerate lying flat and unable to be off BiPAP due to desaturation and tachycardia. Patient then transferred to CICU.   In CICU, patient alert, responsive, on BiPAP. Fs found to be >360, given 6u Admelog.   In [facility], labs notable for ProBNP > ~3800, K+ 6.5, AST 78, Cr 2.24, Lact 2.4. CXR shows evidence of pulmonary edema.  (24 May 2024 12:13)      24 HOUR EVENTS:    REVIEW OF SYSTEMS:    CONSTITUTIONAL: No weakness, fevers or chills  EYES/ENT: No visual changes;  No vertigo or throat pain   NECK: No pain or stiffness  RESPIRATORY: No cough, wheezing, hemoptysis; No shortness of breath  CARDIOVASCULAR: No chest pain or palpitations  GASTROINTESTINAL: No abdominal or epigastric pain. No nausea, vomiting, or hematemesis; No diarrhea or constipation. No melena or hematochezia.  GENITOURINARY: No dysuria, frequency or hematuria  NEUROLOGICAL: No numbness or weakness  SKIN: No itching, rashes      ICU Vital Signs Last 24 Hrs  T(C): 37.1 (24 May 2024 19:00), Max: 38 (24 May 2024 18:00)  T(F): 98.8 (24 May 2024 19:00), Max: 100.4 (24 May 2024 18:00)  HR: 76 (24 May 2024 19:00) (53 - 95)  BP: 136/64 (24 May 2024 19:00) (125/63 - 195/81)  BP(mean): 92 (24 May 2024 19:00) (92 - 116)  ABP: --  ABP(mean): --  RR: 20 (24 May 2024 19:00) (14 - 25)  SpO2: 100% (24 May 2024 19:00) (93% - 100%)    O2 Parameters below as of 24 May 2024 19:00  Patient On (Oxygen Delivery Method): room air            CVP(mm Hg): --  CO: --  CI: --  PA: --  PA(mean): --  PA(direct): --  PCWP: --  LA: --  RA: --  SVR: --  SVRI: --  PVR: --  PVRI: --  I&O's Summary    24 May 2024 07:01  -  24 May 2024 19:49  --------------------------------------------------------  IN: 156 mL / OUT: 3740 mL / NET: -3584 mL        CAPILLARY BLOOD GLUCOSE    CAPILLARY BLOOD GLUCOSE      POCT Blood Glucose.: 165 mg/dL (24 May 2024 17:17)      PHYSICAL EXAM:  GENERAL: No acute distress, well-developed  HEAD:  Atraumatic, Normocephalic  EYES: EOMI, PERRLA, conjunctiva and sclera clear  NECK: Supple, no lymphadenopathy, no JVD  CHEST/LUNG: CTAB; No wheezes, rales, or rhonchi  HEART: Regular rate and rhythm. Normal S1/S2. No murmurs, rubs, or gallops  ABDOMEN: Soft, non-tender, non-distended; normal bowel sounds, no organomegaly  EXTREMITIES:  2+ peripheral pulses b/l, No clubbing, cyanosis, or edema  NEUROLOGY: A&O x 3, no focal deficits  SKIN: No rashes or lesions    ============================I/O===========================   I&O's Detail    24 May 2024 07:01  -  24 May 2024 19:49  --------------------------------------------------------  IN:    Heparin: 36 mL    Oral Fluid: 120 mL  Total IN: 156 mL    OUT:    Indwelling Catheter - Urethral (mL): 3740 mL  Total OUT: 3740 mL    Total NET: -3584 mL        ============================ LABS =========================                        13.8   11.38 )-----------( 168      ( 24 May 2024 12:31 )             38.6     05-24    130<L>  |  99  |  33<H>  ----------------------------<  270<H>  4.6   |  18<L>  |  1.90<H>    Ca    10.0      24 May 2024 14:58  Phos  3.1     05-24  Mg     2.2     05-24    TPro  7.8  /  Alb  4.0  /  TBili  0.8  /  DBili  x   /  AST  92<H>  /  ALT  28  /  AlkPhos  72  05-24    Troponin T, High Sensitivity Result: 1978 ng/L (05-24-24 @ 12:31)    CKMB Units: 41.2 ng/mL (05-24-24 @ 12:31)    Creatine Kinase, Serum: 619 U/L (05-24-24 @ 12:31)    CPK Mass Assay %: 6.7 % (05-24-24 @ 12:31)        LIVER FUNCTIONS - ( 24 May 2024 14:58 )  Alb: 4.0 g/dL / Pro: 7.8 g/dL / ALK PHOS: 72 U/L / ALT: 28 U/L / AST: 92 U/L / GGT: x           PT/INR - ( 24 May 2024 10:30 )   PT: 10.3 sec;   INR: 0.86 ratio         PTT - ( 24 May 2024 13:45 )  PTT:55.5 sec  ABG - ( 24 May 2024 12:09 )  pH, Arterial: 7.32  pH, Blood: x     /  pCO2: 33    /  pO2: 74    / HCO3: 17    / Base Excess: -8.1  /  SaO2: 94.2              Blood Gas Arterial, Lactate: 2.1 mmol/L (05-24-24 @ 12:09)  Blood Gas Venous - Lactate: 2.40 mmol/L (05-24-24 @ 10:39)    Urinalysis Basic - ( 24 May 2024 14:58 )    Color: x / Appearance: x / SG: x / pH: x  Gluc: 270 mg/dL / Ketone: x  / Bili: x / Urobili: x   Blood: x / Protein: x / Nitrite: x   Leuk Esterase: x / RBC: x / WBC x   Sq Epi: x / Non Sq Epi: x / Bacteria: x      ======================Micro/Rad/Cardio=================  Telemetry: Reviewed   EKG: Reviewed  CXR: Reviewed  Culture: Reviewed   Echo:   Cath:           DAQUAN WEST  MRN-[id number]  Patient is a 64y old  Male who presents with a chief complaint of STEMI (24 May 2024 12:13)    HPI:  65y/o M PMHx CABG in 2018, PCI x3 prior to 2018, CKD3, HTN, DM, HLD, presenting with chest pain and shortness of breathing. Patient BIBEMS to [facility]. EMS started patient on CPAP for hypoxia and increased WOB, and gave sublingual nitroglycerin x 2 and IV push lasix 60mg. Per ED note, patient with diffuse rales and wheezing and appearing volume overloaded. Patient received ASA, Brilinta, started on heparin gtt, and given 2g calcium due to elevated K+ on VBG. Patient transferred to [facility] for cardiac catheterization.   In cath lab, patient was evaluated, unable to tolerate lying flat and unable to be off BiPAP due to desaturation and tachycardia. Patient then transferred to CICU.   In CICU, patient alert, responsive, on BiPAP. Fs found to be >360, given 6u Admelog.   In [facility], labs notable for ProBNP > ~3800, K+ 6.5, AST 78, Cr 2.24, Lact 2.4. CXR shows evidence of pulmonary edema.  (24 May 2024 12:13)      24 HOUR EVENTS:  - S/p LHC with no intervention  - Diuresed with lasix  - Transitioned off bipap    REVIEW OF SYSTEMS:  CONSTITUTIONAL: No weakness, fevers or chills  EYES/ENT: No visual changes;  No vertigo or throat pain   NECK: No pain or stiffness  RESPIRATORY: No cough, wheezing, hemoptysis; No shortness of breath  CARDIOVASCULAR: No chest pain or palpitations  GASTROINTESTINAL: No abdominal or epigastric pain. No nausea, vomiting, or hematemesis; No diarrhea or constipation. No melena or hematochezia.  GENITOURINARY: No dysuria, frequency or hematuria  NEUROLOGICAL: No numbness or weakness  SKIN: No itching, rashes      ICU Vital Signs Last 24 Hrs  T(C): 37.1 (24 May 2024 19:00), Max: 38 (24 May 2024 18:00)  T(F): 98.8 (24 May 2024 19:00), Max: 100.4 (24 May 2024 18:00)  HR: 76 (24 May 2024 19:00) (53 - 95)  BP: 136/64 (24 May 2024 19:00) (125/63 - 195/81)  BP(mean): 92 (24 May 2024 19:00) (92 - 116)  ABP: --  ABP(mean): --  RR: 20 (24 May 2024 19:00) (14 - 25)  SpO2: 100% (24 May 2024 19:00) (93% - 100%)    O2 Parameters below as of 24 May 2024 19:00  Patient On (Oxygen Delivery Method): room air              I&O's Summary    24 May 2024 07:01  -  24 May 2024 19:49  --------------------------------------------------------  IN: 156 mL / OUT: 3740 mL / NET: -3584 mL          POCT Blood Glucose.: 165 mg/dL (24 May 2024 17:17)      PHYSICAL EXAM:  GENERAL: No acute distress, well-developed  HEAD:  Atraumatic, Normocephalic  EYES: EOMI, PERRLA, conjunctiva and sclera clear  NECK: Supple, no lymphadenopathy, no JVD  CHEST/LUNG: CTAB; No wheezes, rales, or rhonchi  HEART: Regular rate and rhythm. Normal S1/S2. No murmurs, rubs, or gallops  ABDOMEN: Soft, non-tender, non-distended; normal bowel sounds, no organomegaly  EXTREMITIES:  2+ peripheral pulses b/l, No clubbing, cyanosis, or edema. RF sheath removal site soft, nontender, no evidence of hematoma  NEUROLOGY: A&O x 3, no focal deficits  SKIN: No rashes or lesions    ============================I/O===========================   I&O's Detail    24 May 2024 07:01  -  24 May 2024 19:49  --------------------------------------------------------  IN:    Heparin: 36 mL    Oral Fluid: 120 mL  Total IN: 156 mL    OUT:    Indwelling Catheter - Urethral (mL): 3740 mL  Total OUT: 3740 mL    Total NET: -3584 mL        ============================ LABS =========================                        13.8   11.38 )-----------( 168      ( 24 May 2024 12:31 )             38.6     05-24    130<L>  |  99  |  33<H>  ----------------------------<  270<H>  4.6   |  18<L>  |  1.90<H>    Ca    10.0      24 May 2024 14:58  Phos  3.1     05-24  Mg     2.2     05-24    TPro  7.8  /  Alb  4.0  /  TBili  0.8  /  DBili  x   /  AST  92<H>  /  ALT  28  /  AlkPhos  72  05-24    Troponin T, High Sensitivity Result: 1978 ng/L (05-24-24 @ 12:31)    CKMB Units: 41.2 ng/mL (05-24-24 @ 12:31)    Creatine Kinase, Serum: 619 U/L (05-24-24 @ 12:31)    CPK Mass Assay %: 6.7 % (05-24-24 @ 12:31)        LIVER FUNCTIONS - ( 24 May 2024 14:58 )  Alb: 4.0 g/dL / Pro: 7.8 g/dL / ALK PHOS: 72 U/L / ALT: 28 U/L / AST: 92 U/L / GGT: x           PT/INR - ( 24 May 2024 10:30 )   PT: 10.3 sec;   INR: 0.86 ratio         PTT - ( 24 May 2024 13:45 )  PTT:55.5 sec  ABG - ( 24 May 2024 12:09 )  pH, Arterial: 7.32  pH, Blood: x     /  pCO2: 33    /  pO2: 74    / HCO3: 17    / Base Excess: -8.1  /  SaO2: 94.2              Blood Gas Arterial, Lactate: 2.1 mmol/L (05-24-24 @ 12:09)  Blood Gas Venous - Lactate: 2.40 mmol/L (05-24-24 @ 10:39)    Urinalysis Basic - ( 24 May 2024 14:58 )    Color: x / Appearance: x / SG: x / pH: x  Gluc: 270 mg/dL / Ketone: x  / Bili: x / Urobili: x   Blood: x / Protein: x / Nitrite: x   Leuk Esterase: x / RBC: x / WBC x   Sq Epi: x / Non Sq Epi: x / Bacteria: x      ======================Micro/Rad/Cardio=================  Telemetry: Reviewed   EKG: Reviewed  CXR: Reviewed  Culture: Reviewed   Echo:   Cath:

## 2024-05-24 NOTE — ED PROVIDER NOTE - PHYSICAL EXAMINATION
On Physical Exam:  General: ill appearing, tachypneic but is awake/alert, speaking  HEENT: PERRL, MMM  Neck: no neck tenderness, no nuchal rigidity  Cardiac: s1s2 regular  Lungs: diffuse wheezing/rales, + increased work of breathing (improved when changed to bipap)  Abdomen: soft nontender/nondistended  Skin: intact, no rash  Extremities: 2+ bilateral lower extremity pitting peripheral edema, no gross deformities

## 2024-05-24 NOTE — ED ADULT TRIAGE NOTE - CHIEF COMPLAINT QUOTE
BIBA from home for shortness of breath around 0700, arrived on cpap, as per emt, noted rales, ran out of lasix 40mg and insulin 4 days ago, was given 0.4mg nitro spray and 60mg lasix ivp by emt  hx of chf

## 2024-05-24 NOTE — ED PROVIDER NOTE - OBJECTIVE STATEMENT
Attending note (Hunter): 65y/o M with h/o HTN, DM, HLD, presenting with chest pain and shortness of breathing; worsening since last night; reportedly on lasix previously, but has not had any home medications (ran out) for past ~3 days.  Felt unwell last night; this morning awoke with chest tightness, back tightness and difficulty breathing. EMs started patietn on CPAP for hypoxia and increased WOB, and gave nitroglycerin s.l. x 2 and ivp lasix 60mg.  Patient Irish speaking; family translating at his/their request.

## 2024-05-24 NOTE — H&P ADULT - NSICDXFAMILYHX_GEN_ALL_CORE_FT
FAMILY HISTORY:  Sibling  Still living? Unknown  FH: kidney disease, Age at diagnosis: Age Unknown

## 2024-05-24 NOTE — ED PROVIDER NOTE - PROGRESS NOTE DETAILS
Attending note (Hunter): case discussed with rj Dewitt for cardiology/?catheterization Attending note (Hunter): VBG lab obtained potassium 6.5 suspect likely hemolyzed however out of abundance of caution given EKG changes will give calcium 2 g Attending note (Hunter): VBG lab obtained potassium 6.5 suspect likely hemolyzed however out of abundance of caution given EKG changes will give calcium 2 g; EMS here for transport getting calcium in and then will allow transport.

## 2024-05-24 NOTE — ED PROVIDER NOTE - CARE PLAN
Consultation - 126 [facility]  Leslie Hodge 38 y o  male MRN: [id number]  Unit/Bed#: -01 Encounter: [id number]        Consults    Reason for Consult / Principal Problem:     Anemia, liver disease      ASSESSMENT AND PLAN:      Patient has severe anemia microcytosis high RDW, though there is no overt GI blood loss but any ongoing GI blood loss remains a diagnosis of exclusion  His MCV was normal couple years ago so I doubt it is thalassemia  He has received blood transfusion, tentative plans for EGD tomorrow to check for portal hypertensive gastropathy and/or varices or other source of possible blood loss  If no active GI bleeding, then can be re-evaluated for possible alcoholic hepatitis and or use of steroids  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, patient well aware of fatal complications of advanced liver disease  Continue aggressive diuresis with IV diuretics and albumin infusion  Check ultrasound  Management of encephalopathy as being done  Monitor for alcohol withdrawal/ delirium tremens  Alcoholism/alcohol dependence and other medical problems  Patient well aware of fatal complications of advanced liver disease  Discussed with Dr Werner Nyhan as well   ______________________________________________________________________    HPI:      Patient was admitted with confusion, evaluation revealed alcohol level of 448 elevated ammonia at 98 and severe anemia hemoglobin of 3 6  Since then he has does see blood transfusion, and is seems fairly alert awake appropriate at the time of my evaluation  Patient complains of some swelling of his whole body, feels very weak fatigued, denies any abdominal pain nausea vomiting dysphagia  He also denies any melena hematochezia hematemesis blood in the urine sometimes though stools are dark  He denies any known history of ulcer disease varices or and or upper GI bleeding    He does not recall any prior EGD/colonoscopy either  Denies taking excessive aspirin NSAIDs  Does consume a bottle of vodka every day, lives at home with his parents  He is a   He denies any chest pains, feels weak tired fatigued, no history of CVA seizures CAD stents pacemakers  Diet medications more than 10 pertinent systems reviewed  He tells me he has gone to be a [place], was referred to [place] in the [place], he was also being to the hospital here may be year ago with advanced liver disease, he has been to rehab couple times, has any advised to stop drinking alcohol but he has not  REVIEW OF SYSTEMS:    CONSTITUTIONAL: Denies any fever, chills, rigors, and weight loss  HEENT: No earache or tinnitus  Denies hearing loss or visual disturbances  CARDIOVASCULAR: No chest pain or palpitations  RESPIRATORY: Denies any cough, hemoptysis, shortness of breath or dyspnea on exertion  GASTROINTESTINAL: As noted in the History of Present Illness  GENITOURINARY: No problems with urination  Denies any hematuria or dysuria  NEUROLOGIC: No dizziness or vertigo, denies headaches  MUSCULOSKELETAL: Denies any muscle or joint pain  SKIN: Denies skin rashes or itching  ENDOCRINE: Denies excessive thirst  Denies intolerance to heat or cold  PSYCHOSOCIAL: Denies depression or anxiety  Denies any recent memory loss         Historical Information   Past Medical History:   Diagnosis Date    AAA (abdominal aortic aneurysm) ([facility] )     Allergic     Anemia     Asthma     Depression 05/18/2021    Essential hypertension 05/06/2019    Obesity     Opiate dependence ([facility] )     Substance abuse ([place] [id number] )      Past Surgical History:   Procedure Laterality Date    LYMPH NODE BIOPSY       Social History   Social History     Substance and Sexual Activity   Alcohol Use Yes    Alcohol/week: 12 0 standard drinks    Types: 12 Cans of beer per week     Social History     Substance and Sexual Activity   Drug Use Not Currently    Types: Heroin Comment: 7/16/22 recovery     Social History     Tobacco Use   Smoking Status Current Every Day Smoker    Packs/day: 1 00   Smokeless Tobacco Never Used   Tobacco Comment    2/2019; PATIENT VAPES     Family History   Problem Relation Age of Onset    No Known Problems Mother     Diabetes Father     Prostate cancer Father     Hypertension Father        Meds/Allergies     Medications Prior to Admission   Medication    albuterol (Ventolin HFA) 90 mcg/act inhaler    buprenorphine-naloxone (SUBOXONE) 8-2 mg per SL tablet    escitalopram (LEXAPRO) 20 mg tablet    zolpidem (AMBIEN CR) 6 25 MG CR tablet    ammonium lactate (LAC-HYDRIN) 12 % lotion    ferrous sulfate 324 (65 Fe) mg    folic acid (FOLVITE) 1 mg tablet    furosemide (LASIX) 40 mg tablet    lactulose (CHRONULAC) 10 g/15 mL solution    nicotine (NICODERM CQ) 14 mg/24hr TD 24 hr patch    pantoprazole (PROTONIX) 40 mg tablet    spironolactone (ALDACTONE) 50 mg tablet     Current Facility-Administered Medications   Medication Dose Route Frequency    acetaminophen (TYLENOL) tablet 650 mg  650 mg Oral Q6H PRN    albumin human (FLEXBUMIN) 25 % injection 25 g  25 g Intravenous TID    albuterol (PROVENTIL HFA,VENTOLIN HFA) inhaler 2 puff  2 puff Inhalation Q6H PRN    buprenorphine-naloxone (Suboxone) film 8 mg  8 mg Sublingual BID    escitalopram (LEXAPRO) tablet 20 mg  20 mg Oral Daily    ferrous sulfate tablet 325 mg  325 mg Oral Daily With Breakfast    folic acid (FOLVITE) tablet 1 mg  1 mg Oral Daily    furosemide (LASIX) injection 40 mg  40 mg Intravenous BID (diuretic)    lactulose oral solution 20 g  20 g Oral BID    LORazepam (ATIVAN) tablet 1 mg  1 mg Oral Q8H PRN    midodrine (PROAMATINE) tablet 10 mg  10 mg Oral TID AC    multivitamin-minerals (CENTRUM) tablet 1 tablet  1 tablet Oral Daily    nicotine (NICODERM CQ) 21 mg/24 hr TD 24 hr patch 1 patch  1 patch Transdermal Daily    oxazepam (SERAX) capsule 10 mg  10 mg Oral Q8H [address]  pantoprazole (PROTONIX) EC tablet 40 mg  40 mg Oral Early Morning    potassium chloride (K-DUR,KLOR-CON) CR tablet 40 mEq  40 mEq Oral TID With Meals    spironolactone (ALDACTONE) tablet 50 mg  50 mg Oral Daily    thiamine tablet 100 mg  100 mg Oral Daily    trimethobenzamide (TIGAN) IM injection 200 mg  200 mg Intramuscular Q6H PRN       Allergies   Allergen Reactions    Levofloxacin Other (See Comments)     BLACKED OUT           Objective     Blood pressure 141/71, pulse (!) 115, temperature (!) 101 5 °F (38 6 °C), temperature source Tympanic, resp  rate 16, height 6' 1" (1 854 m), weight 96 6 kg (212 lb 15 4 oz), SpO2 95 %  Body mass index is 28 1 kg/m²  Intake/Output Summary (Last 24 hours) at 7/17/2022 1500  Last data filed at 7/17/2022 1347  Gross per 24 hour   Intake 1770 ml   Output 1250 ml   Net 520 ml         PHYSICAL EXAM:      General Appearance:   Alert, cooperative, no distress   HEENT:   Normocephalic, atraumatic, anicteric      Neck:  Supple, symmetrical, trachea midline   Lungs:   Clear to auscultation bilaterally; no rales, rhonchi or wheezing; respirations unlabored    Heart[de-identified]   Regular rate and rhythm; no murmur, rub, or gallop     Abdomen:   Soft, non-tender, non-distended; normal bowel sounds; no masses, no organomegaly    Genitalia:   Deferred    Rectal:   Deferred    Extremities:  No cyanosis, clubbing or edema    Pulses:  2+ and symmetric all extremities    Skin:  No jaundice, rashes, or lesions    Lymph nodes:  No palpable cervical lymphadenopathy        Lab Results:   Admission on 07/16/2022   Component Date Value    WBC 07/16/2022 6 09     RBC 07/16/2022 2 15 (A)    Hemoglobin 07/16/2022 3 6 (A)    Hematocrit 07/16/2022 13 7 (A)    MCV 07/16/2022 64 (A)    MCH 07/16/2022 16 7 (A)    MCHC 07/16/2022 26 3 (A)    RDW 07/16/2022 30 8 (A)    MPV 07/16/2022 8 5 (A)    Platelets 48/06/9167 140 (A)    nRBC 07/16/2022 3     Neutrophils Relative 07/16/2022 66     Immat GRANS % 07/16/2022 2     Lymphocytes Relative 07/16/2022 23     Monocytes Relative 07/16/2022 9     Eosinophils Relative 07/16/2022 0     Basophils Relative 07/16/2022 0     Neutrophils Absolute 07/16/2022 3 99     Immature Grans Absolute 07/16/2022 0 12     Lymphocytes Absolute 07/16/2022 1 42     Monocytes Absolute 07/16/2022 0 53     Eosinophils Absolute 07/16/2022 0 01     Basophils Absolute 07/16/2022 0 02     Sodium 07/16/2022 133 (A)    Potassium 07/16/2022 4 0     Chloride 07/16/2022 94 (A)    CO2 07/16/2022 28     ANION GAP 07/16/2022 11     BUN 07/16/2022 8     Creatinine 07/16/2022 0 32 (A)    Glucose 07/16/2022 118     Calcium 07/16/2022 8 1 (A)    Corrected Calcium 07/16/2022 8 7     AST 07/16/2022 128 (A)    ALT 07/16/2022 29     Alkaline Phosphatase 07/16/2022 221 (A)    Total Protein 07/16/2022 7 6     Albumin 07/16/2022 3 3 (A)    Total Bilirubin 07/16/2022 4 61 (A)    eGFR 07/16/2022 166     Lipase 07/16/2022 50     hs TnI 0hr 07/16/2022 6     Ethanol Lvl 07/16/2022 448 (A)    Ammonia 07/16/2022 98 (A)    Protime 07/16/2022 21 1 (A)    INR 07/16/2022 1 87 (A)    PTT 07/16/2022 45 (A)    Amph/Meth UR 07/16/2022 Negative     Barbiturate Ur 07/16/2022 Negative     Benzodiazepine Urine 07/16/2022 Negative     Cocaine Urine 07/16/2022 Negative     Methadone Urine 07/16/2022 Negative     Opiate Urine 07/16/2022 Negative     PCP Ur 07/16/2022 Negative     THC Urine 07/16/2022 Negative     Oxycodone Urine 07/16/2022 Negative     Phosphorus 07/16/2022 2 6 (A)    Magnesium 07/16/2022 2 0     Total CK 07/16/2022 40     OCCULT BLD, FECAL IMMUNO* 07/16/2022 Negative     hs TnI 2hr 07/16/2022 6     Delta 2hr hsTnI 07/16/2022 0     Unit Product Code 07/17/2022 [id number]     Unit Number 07/17/2022 [id number]     Unit ABO 07/17/2022 A     Unit [facility] 07/17/2022 POS     Crossmatch 07/17/2022 Compatible     Unit Dispense Status 07/17/2022 Presumed Trans     Unit Product Volume 07/17/2022 350     Unit Product Code 07/17/2022 [id number]     Unit Number 07/17/2022 [id number]     Unit ABO 07/17/2022 A     Unit RH 07/17/2022 POS     Crossmatch 07/17/2022 Compatible     Unit Dispense Status 07/17/2022 Presumed Trans     Unit Product Volume 07/17/2022 350     ABO Grouping 07/16/2022 A     Rh Factor 07/16/2022 Positive     Antibody Screen 07/16/2022 Negative     Specimen Expiration Date 07/16/2022 [id number]     AFP TUMOR MARKER 07/16/2022 3 5     Folate 07/16/2022 3 7     Vitamin B-12 07/16/2022 1,422 (A)    Bilirubin, Direct 07/16/2022 2 58 (A)    Iron Saturation 07/16/2022 4 (A)    TIBC 07/16/2022 342     Iron 07/16/2022 13 (A)    Ferritin 07/16/2022 34     Unit Product Code 07/17/2022 [id number]     Unit Number 07/17/2022 [id number]     Unit ABO 07/17/2022 A     Unit [facility] 07/17/2022 POS     Crossmatch 07/17/2022 Compatible     Unit Dispense Status 07/17/2022 Presumed Trans     Unit Product Volume 07/17/2022 350     Sodium 07/17/2022 135     Potassium 07/17/2022 2 7 (A)    Chloride 07/17/2022 94 (A)    CO2 07/17/2022 29     ANION GAP 07/17/2022 12     BUN 07/17/2022 7     Creatinine 07/17/2022 0 35 (A)    Glucose 07/17/2022 123     Calcium 07/17/2022 7 9 (A)    Corrected Calcium 07/17/2022 8 5     AST 07/17/2022 100 (A)    ALT 07/17/2022 26     Alkaline Phosphatase 07/17/2022 194 (A)    Total Protein 07/17/2022 7 1     Albumin 07/17/2022 3 2 (A)    Total Bilirubin 07/17/2022 5 47 (A)    eGFR 07/17/2022 160     Magnesium 07/17/2022 1 6 (A)    Phosphorus 07/17/2022 2 6 (A)    WBC 07/17/2022 4 91     RBC 07/17/2022 2 40 (A)    Hemoglobin 07/17/2022 4 7 (A)    Hematocrit 07/17/2022 16 1 (A)    MCV 07/17/2022 67 (A)    MCH 07/17/2022 19 6 (A)    MCHC 07/17/2022 29 2 (A)    RDW 07/17/2022 30 5 (A)    Platelets 75/52/2002 101 (A)    Unit Product Code 07/17/2022 [id number]     Unit Number 07/17/2022 [id number]     Unit ABO 07/17/2022 A     Unit RH 07/17/2022 POS     Crossmatch 07/17/2022 Compatible     Unit Dispense Status 07/17/2022 Issued     Unit Product Volume 07/17/2022 350     LD 07/17/2022 234     Hemoglobin 07/17/2022 5 4 (A)    Hematocrit 07/17/2022 17 8 (A)    Retic Ct Abs 07/17/2022 <10,000 (A)    Retic Ct Pct 07/17/2022 0 39     Segmented Neutrophils Ma* 07/17/2022 71     Bands Manual 07/17/2022 1     Lymphocytes Manual 07/17/2022 21     Monocytes Manual 07/17/2022 6     Basos 07/17/2022 1     Total Counted 07/17/2022 100     nRBC 07/17/2022 5 (A)    RBC Morphology 07/17/2022 Present     Hypochromia 07/17/2022 Present     Poikilocytes 07/17/2022 Present     Polychromasia 07/17/2022 Present     Elliptocytes 07/17/2022 Present     Platelet Estimate 68/08/1318 Decreased (A)    Sodium 07/17/2022 134 (A)    Potassium 07/17/2022 3 3 (A)    Chloride 07/17/2022 94 (A)    CO2 07/17/2022 29     ANION GAP 07/17/2022 11     BUN 07/17/2022 6     Creatinine 07/17/2022 0 38 (A)    Glucose 07/17/2022 121     Calcium 07/17/2022 8 5     Corrected Calcium 07/17/2022 9 1     AST 07/17/2022 103 (A)    ALT 07/17/2022 26     Alkaline Phosphatase 07/17/2022 201 (A)    Total Protein 07/17/2022 7 2     Albumin 07/17/2022 3 3 (A)    Total Bilirubin 07/17/2022 6 66 (A)    eGFR 07/17/2022 154     Magnesium 07/17/2022 2 2     Phosphorus 07/17/2022 2 3 (A)    Unit Product Code 07/17/2022 [id number]     Unit Number 07/17/2022 [id number]     Unit ABO 07/17/2022 A     Unit [facility] 07/17/2022 POS     Crossmatch 07/17/2022 Compatible     Unit Dispense Status 07/17/2022 Crossmatched     Unit Product Volume 07/17/2022 350        Imaging Studies: I have personally reviewed pertinent imaging studies  Principal Discharge DX:	STEMI (ST elevation myocardial infarction)   1

## 2024-05-25 DIAGNOSIS — E11.9 TYPE 2 DIABETES MELLITUS WITHOUT COMPLICATIONS: ICD-10-CM

## 2024-05-25 DIAGNOSIS — H40.9 UNSPECIFIED GLAUCOMA: ICD-10-CM

## 2024-05-25 DIAGNOSIS — I10 ESSENTIAL (PRIMARY) HYPERTENSION: ICD-10-CM

## 2024-05-25 DIAGNOSIS — I21.3 ST ELEVATION (STEMI) MYOCARDIAL INFARCTION OF UNSPECIFIED SITE: ICD-10-CM

## 2024-05-25 DIAGNOSIS — Z95.1 PRESENCE OF AORTOCORONARY BYPASS GRAFT: ICD-10-CM

## 2024-05-25 DIAGNOSIS — N18.30 CHRONIC KIDNEY DISEASE, STAGE 3 UNSPECIFIED: ICD-10-CM

## 2024-05-25 DIAGNOSIS — E78.5 HYPERLIPIDEMIA, UNSPECIFIED: ICD-10-CM

## 2024-05-25 DIAGNOSIS — Z29.9 ENCOUNTER FOR PROPHYLACTIC MEASURES, UNSPECIFIED: ICD-10-CM

## 2024-05-25 LAB
A1C WITH ESTIMATED AVERAGE GLUCOSE RESULT: 8.4 % — HIGH (ref 4–5.6)
ALBUMIN SERPL ELPH-MCNC: 3.3 G/DL — SIGNIFICANT CHANGE UP (ref 3.3–5)
ALP SERPL-CCNC: 73 U/L — SIGNIFICANT CHANGE UP (ref 40–120)
ALT FLD-CCNC: 23 U/L — SIGNIFICANT CHANGE UP (ref 10–45)
ANION GAP SERPL CALC-SCNC: 14 MMOL/L — SIGNIFICANT CHANGE UP (ref 5–17)
APTT BLD: 79.7 SEC — HIGH (ref 24.5–35.6)
AST SERPL-CCNC: 65 U/L — HIGH (ref 10–40)
BILIRUB SERPL-MCNC: 1.1 MG/DL — SIGNIFICANT CHANGE UP (ref 0.2–1.2)
BUN SERPL-MCNC: 31 MG/DL — HIGH (ref 7–23)
CALCIUM SERPL-MCNC: 9.6 MG/DL — SIGNIFICANT CHANGE UP (ref 8.4–10.5)
CHLORIDE SERPL-SCNC: 95 MMOL/L — LOW (ref 96–108)
CO2 SERPL-SCNC: 21 MMOL/L — LOW (ref 22–31)
CREAT SERPL-MCNC: 1.91 MG/DL — HIGH (ref 0.5–1.3)
EGFR: 38 ML/MIN/1.73M2 — LOW
ESTIMATED AVERAGE GLUCOSE: 194 MG/DL — HIGH (ref 68–114)
GLUCOSE BLDC GLUCOMTR-MCNC: 161 MG/DL — HIGH (ref 70–99)
GLUCOSE BLDC GLUCOMTR-MCNC: 168 MG/DL — HIGH (ref 70–99)
GLUCOSE BLDC GLUCOMTR-MCNC: 286 MG/DL — HIGH (ref 70–99)
GLUCOSE BLDC GLUCOMTR-MCNC: 313 MG/DL — HIGH (ref 70–99)
GLUCOSE BLDC GLUCOMTR-MCNC: 374 MG/DL — HIGH (ref 70–99)
GLUCOSE BLDC GLUCOMTR-MCNC: 387 MG/DL — HIGH (ref 70–99)
GLUCOSE SERPL-MCNC: 421 MG/DL — HIGH (ref 70–99)
HCT VFR BLD CALC: 36.6 % — LOW (ref 39–50)
HGB BLD-MCNC: 12.8 G/DL — LOW (ref 13–17)
MAGNESIUM SERPL-MCNC: 1.9 MG/DL — SIGNIFICANT CHANGE UP (ref 1.6–2.6)
MCHC RBC-ENTMCNC: 29 PG — SIGNIFICANT CHANGE UP (ref 27–34)
MCHC RBC-ENTMCNC: 35 GM/DL — SIGNIFICANT CHANGE UP (ref 32–36)
MCV RBC AUTO: 82.8 FL — SIGNIFICANT CHANGE UP (ref 80–100)
NRBC # BLD: 0 /100 WBCS — SIGNIFICANT CHANGE UP (ref 0–0)
PHOSPHATE SERPL-MCNC: 3.7 MG/DL — SIGNIFICANT CHANGE UP (ref 2.5–4.5)
PLATELET # BLD AUTO: 161 K/UL — SIGNIFICANT CHANGE UP (ref 150–400)
POTASSIUM SERPL-MCNC: 4 MMOL/L — SIGNIFICANT CHANGE UP (ref 3.5–5.3)
POTASSIUM SERPL-SCNC: 4 MMOL/L — SIGNIFICANT CHANGE UP (ref 3.5–5.3)
PROT SERPL-MCNC: 7 G/DL — SIGNIFICANT CHANGE UP (ref 6–8.3)
RBC # BLD: 4.42 M/UL — SIGNIFICANT CHANGE UP (ref 4.2–5.8)
RBC # FLD: 12.8 % — SIGNIFICANT CHANGE UP (ref 10.3–14.5)
SODIUM SERPL-SCNC: 130 MMOL/L — LOW (ref 135–145)
TSH SERPL-MCNC: 3.72 UIU/ML — SIGNIFICANT CHANGE UP (ref 0.27–4.2)
WBC # BLD: 10.21 K/UL — SIGNIFICANT CHANGE UP (ref 3.8–10.5)
WBC # FLD AUTO: 10.21 K/UL — SIGNIFICANT CHANGE UP (ref 3.8–10.5)

## 2024-05-25 PROCEDURE — 99223 1ST HOSP IP/OBS HIGH 75: CPT | Mod: GC

## 2024-05-25 PROCEDURE — 99291 CRITICAL CARE FIRST HOUR: CPT | Mod: 25

## 2024-05-25 PROCEDURE — 93010 ELECTROCARDIOGRAM REPORT: CPT

## 2024-05-25 RX ORDER — HUMAN INSULIN 100 [IU]/ML
15 INJECTION, SUSPENSION SUBCUTANEOUS ONCE
Refills: 0 | Status: DISCONTINUED | OUTPATIENT
Start: 2024-05-25 | End: 2024-05-25

## 2024-05-25 RX ORDER — LANOLIN ALCOHOL/MO/W.PET/CERES
5 CREAM (GRAM) TOPICAL AT BEDTIME
Refills: 0 | Status: DISCONTINUED | OUTPATIENT
Start: 2024-05-25 | End: 2024-05-29

## 2024-05-25 RX ORDER — MAGNESIUM SULFATE 500 MG/ML
1 VIAL (ML) INJECTION ONCE
Refills: 0 | Status: COMPLETED | OUTPATIENT
Start: 2024-05-25 | End: 2024-05-25

## 2024-05-25 RX ORDER — CARVEDILOL PHOSPHATE 80 MG/1
6.25 CAPSULE, EXTENDED RELEASE ORAL EVERY 12 HOURS
Refills: 0 | Status: DISCONTINUED | OUTPATIENT
Start: 2024-05-25 | End: 2024-05-29

## 2024-05-25 RX ORDER — INSULIN LISPRO 100/ML
10 VIAL (ML) SUBCUTANEOUS ONCE
Refills: 0 | Status: DISCONTINUED | OUTPATIENT
Start: 2024-05-25 | End: 2024-05-25

## 2024-05-25 RX ORDER — GABAPENTIN 400 MG/1
300 CAPSULE ORAL DAILY
Refills: 0 | Status: DISCONTINUED | OUTPATIENT
Start: 2024-05-25 | End: 2024-05-29

## 2024-05-25 RX ORDER — DORZOLAMIDE HYDROCHLORIDE TIMOLOL MALEATE 20; 5 MG/ML; MG/ML
1 SOLUTION/ DROPS OPHTHALMIC
Refills: 0 | Status: DISCONTINUED | OUTPATIENT
Start: 2024-05-25 | End: 2024-05-29

## 2024-05-25 RX ORDER — INSULIN LISPRO 100/ML
5 VIAL (ML) SUBCUTANEOUS ONCE
Refills: 0 | Status: COMPLETED | OUTPATIENT
Start: 2024-05-25 | End: 2024-05-25

## 2024-05-25 RX ORDER — ACETAMINOPHEN 500 MG
650 TABLET ORAL EVERY 6 HOURS
Refills: 0 | Status: DISCONTINUED | OUTPATIENT
Start: 2024-05-25 | End: 2024-05-29

## 2024-05-25 RX ORDER — LATANOPROST 0.05 MG/ML
1 SOLUTION/ DROPS OPHTHALMIC; TOPICAL AT BEDTIME
Refills: 0 | Status: DISCONTINUED | OUTPATIENT
Start: 2024-05-25 | End: 2024-05-29

## 2024-05-25 RX ORDER — INSULIN GLARGINE 100 [IU]/ML
30 INJECTION, SOLUTION SUBCUTANEOUS AT BEDTIME
Refills: 0 | Status: DISCONTINUED | OUTPATIENT
Start: 2024-05-25 | End: 2024-05-26

## 2024-05-25 RX ORDER — HEPARIN SODIUM 5000 [USP'U]/ML
5000 INJECTION INTRAVENOUS; SUBCUTANEOUS EVERY 8 HOURS
Refills: 0 | Status: DISCONTINUED | OUTPATIENT
Start: 2024-05-25 | End: 2024-05-27

## 2024-05-25 RX ORDER — INSULIN LISPRO 100/ML
10 VIAL (ML) SUBCUTANEOUS
Refills: 0 | Status: DISCONTINUED | OUTPATIENT
Start: 2024-05-25 | End: 2024-05-27

## 2024-05-25 RX ADMIN — HEPARIN SODIUM 9 UNIT(S)/HR: 5000 INJECTION INTRAVENOUS; SUBCUTANEOUS at 00:18

## 2024-05-25 RX ADMIN — HUMAN INSULIN 15 UNIT(S): 100 INJECTION, SUSPENSION SUBCUTANEOUS at 08:50

## 2024-05-25 RX ADMIN — Medication 100 GRAM(S): at 06:38

## 2024-05-25 RX ADMIN — CARVEDILOL PHOSPHATE 6.25 MILLIGRAM(S): 80 CAPSULE, EXTENDED RELEASE ORAL at 17:27

## 2024-05-25 RX ADMIN — DORZOLAMIDE HYDROCHLORIDE TIMOLOL MALEATE 1 DROP(S): 20; 5 SOLUTION/ DROPS OPHTHALMIC at 17:24

## 2024-05-25 RX ADMIN — Medication 100 MICROGRAM(S): at 06:38

## 2024-05-25 RX ADMIN — Medication 10 UNIT(S): at 12:17

## 2024-05-25 RX ADMIN — Medication 4: at 11:39

## 2024-05-25 RX ADMIN — TICAGRELOR 90 MILLIGRAM(S): 90 TABLET ORAL at 17:26

## 2024-05-25 RX ADMIN — CARVEDILOL PHOSPHATE 6.25 MILLIGRAM(S): 80 CAPSULE, EXTENDED RELEASE ORAL at 08:58

## 2024-05-25 RX ADMIN — Medication 5: at 08:31

## 2024-05-25 RX ADMIN — HEPARIN SODIUM 9 UNIT(S)/HR: 5000 INJECTION INTRAVENOUS; SUBCUTANEOUS at 08:32

## 2024-05-25 RX ADMIN — LATANOPROST 1 DROP(S): 0.05 SOLUTION/ DROPS OPHTHALMIC; TOPICAL at 22:05

## 2024-05-25 RX ADMIN — GABAPENTIN 300 MILLIGRAM(S): 400 CAPSULE ORAL at 22:11

## 2024-05-25 RX ADMIN — Medication 5 MILLIGRAM(S): at 22:05

## 2024-05-25 RX ADMIN — ATORVASTATIN CALCIUM 80 MILLIGRAM(S): 80 TABLET, FILM COATED ORAL at 22:05

## 2024-05-25 RX ADMIN — Medication 10 UNIT(S): at 17:27

## 2024-05-25 RX ADMIN — Medication 81 MILLIGRAM(S): at 11:39

## 2024-05-25 RX ADMIN — Medication 5 MILLIGRAM(S): at 01:30

## 2024-05-25 RX ADMIN — Medication 1: at 17:27

## 2024-05-25 RX ADMIN — TICAGRELOR 90 MILLIGRAM(S): 90 TABLET ORAL at 06:38

## 2024-05-25 RX ADMIN — Medication 5 UNIT(S): at 06:37

## 2024-05-25 RX ADMIN — CILOSTAZOL 100 MILLIGRAM(S): 100 TABLET ORAL at 06:37

## 2024-05-25 RX ADMIN — Medication 1 DROP(S): at 22:06

## 2024-05-25 RX ADMIN — Medication 1 DROP(S): at 14:10

## 2024-05-25 RX ADMIN — INSULIN GLARGINE 30 UNIT(S): 100 INJECTION, SOLUTION SUBCUTANEOUS at 22:05

## 2024-05-25 RX ADMIN — Medication 40 MILLIGRAM(S): at 06:38

## 2024-05-25 RX ADMIN — HEPARIN SODIUM 5000 UNIT(S): 5000 INJECTION INTRAVENOUS; SUBCUTANEOUS at 22:04

## 2024-05-25 RX ADMIN — CILOSTAZOL 100 MILLIGRAM(S): 100 TABLET ORAL at 17:24

## 2024-05-25 NOTE — PROGRESS NOTE ADULT - ASSESSMENT
63y/o M PMHx CABG in 2018, PCI x3 prior to 2018, CKD3, HTN, DM, HLD, presenting with chest pain and shortness of breathing. EKG showed STEMI pathology, patient transferred to cath lab for LHC, but unable to tolerate lying flat. Patient transferred to CICU for further monitoring on BiPAP, now weaned to room air and transferred to medicine service pending repeat cath.

## 2024-05-25 NOTE — PROGRESS NOTE ADULT - ATTENDING COMMENTS
64M with PMHx of HTN, T2DM, CKD3, CAD (post-CABG 2018), and glaucoma transferred from [facility] due to concern for STEMI. Patient presented there with SOB and elevated troponin. EKG c/f STEMI so transferred to CICU here. Per cardiology unlikely plaque rupture, but demand from hypervolemia. LHC with LIMA-LAD occlusion. Unable to perform intervention due to patient's inability to lay flat and BIPAP use. Downgraded to telemetry on 5/25.    Daughter at bedside providing translation per patient request. No complaints right now. Denies CP/SOB/Leg Swelling.     Labs personally reviewed     TTE with normal EF  Prior CXR with vascular congestion    Plan:    - Per cardiology plan for repeat LHC and revascularization once more optimized resp-wise  - Cr likely baseline at 2 with CKD3  - Start basal-bolus, monitor FS (home meds include Janumet, Starlix, and basal insulin 30 units qhs). A1c 8  - Continue with Glaucoma drops, Rhopressa not available  - Appears more euvolemic, continue with Lasix PO  - Can resume Losartan after cath (daughter describes likely diabetic nephropathy)    Patient with obvious macrovascular and microvascular disease

## 2024-05-25 NOTE — PROGRESS NOTE ADULT - ASSESSMENT
63y/o M PMHx CABG in 2018, PCI x3 prior to 2018, CKD3, HTN, DM, HLD, presenting with chest pain and shortness of breathing. EKG showed STEMI pathology, patient transferred to cath lab for C, unable to tolerate lying flat. Patient transferred to CICU for further monitoring.     NEURO:  #Mental status: baseline   -Currently A&O x 3    CV:  #STEMI (+EKG changes): Typical/atypical symptoms, loaded with 325 mg ASA, 180mg Ticagrelor, heparin bolus   - Revascularization (within 48hrs of symptoms) with PCI, unable to lie flat. Will optimize respiratory status and reach out to interventional cardiology for reattempt   - DAPT: ASA 81, Ticagrelor 90mg BID for 1 year   - Statin: atorvastatin 80mg qD   - Heparin gtt   - Stat TTE   - Obtain cardiac enzymes     PULM:  #Pulmonary edema   Patient appears volume overloaded   - Aggressive diuresis with Lasix     RENAL:  #TRANG: Elevated Cr near baseline of 2.2   - Place lu   - Monitor I/Os     #Hyperkalemia   - Shift as needed, trial Lokelma when able to tolerate off BiPAP     GI:  - DASH/Consistent carb diet     #Elevated AST   Likely 2/2 HF   - Continue to monitor     ENDO:  #DM2: Send HbA1c   - Insulin Sliding Scale   - Lantus 20u qHS     HEMATOLOGIC:  #DVT prophylaxis with heparin gtt     #Mild leukocytosis   Likely 2/2 ACS and heart failure   - Monitor off abx   - Continue to trend     ID:  #Pt without strong objective or clinical evidence of infection. Will observe off antibiotics     SKIN:  #Lines: Lu (5/24-)   Peripherals     #R foot nail falling off   - Podiatry c/s when patient more stable    65y/o M PMHx CABG in 2018, PCI x3 prior to 2018, CKD3, HTN, DM, HLD, presenting with chest pain and shortness of breathing. EKG showed STEMI pathology, patient transferred to cath lab for C, unable to tolerate lying flat. Patient transferred to CICU for further monitoring.     NEURO:  #Mental status: baseline   -Currently A&O x 3    CV:  #STEMI (+EKG changes): Typical/atypical symptoms, loaded with 325 mg ASA, 180mg Ticagrelor, heparin bolus   - Revascularization (within 48hrs of symptoms) with PCI, unable to lie flat. Will optimize respiratory status and reach out to interventional cardiology for reattempt   - DAPT: ASA 81, Ticagrelor 90mg BID for 1 year   - Statin: atorvastatin 80mg qD   - Heparin gtt     PULM:  #Pulmonary edema   Patient appears volume overloaded   - Aggressive diuresis with Lasix     RENAL:  #TRANG: Elevated Cr near baseline of 2.2   - Place lu   - Monitor I/Os     #Hyperkalemia   - Shift as needed, trial Lokelma when able to tolerate off BiPAP     GI:  - DASH/Consistent carb diet     #Elevated AST   Likely 2/2 HF   - Continue to monitor     ENDO: (A1c 8.0 on admission_) F/S 300'S  - Insulin Sliding Scale   - Lantus 20u qHS     HEMATOLOGIC:  #DVT prophylaxis with heparin gtt     #Mild leukocytosis   Likely 2/2 ACS and heart failure   - Monitor off abx   - Continue to trend     ID:  #Pt without strong objective or clinical evidence of infection. Will observe off antibiotics     SKIN:  #Lines: Lu (5/24-)   Peripherals     #R foot nail falling off   - Podiatry c/s when patient more stable

## 2024-05-25 NOTE — PROGRESS NOTE ADULT - SUBJECTIVE AND OBJECTIVE BOX
DATE OF SERVICE: 05-25-24 @ 13:45    Patient is a 64y old  Male who presents with a chief complaint of STEMI (25 May 2024 07:35)      SUBJECTIVE / OVERNIGHT EVENTS: Patient admitted to medicine as CCU downgrade for STEMI with 90% stenosis of LIMA-LAD. He was unable to tolerate laying flat and on bipap so intervention not performed during cath. He is now on room air. Patient denies any chest pain, shortness of breath or abdominal pain.     MEDICATIONS  (STANDING):  artificial  tears Solution 1 Drop(s) Both EYES three times a day  aspirin  chewable 81 milliGRAM(s) Oral daily  atorvastatin 80 milliGRAM(s) Oral at bedtime  carvedilol 6.25 milliGRAM(s) Oral every 12 hours  cilostazol 100 milliGRAM(s) Oral two times a day  dorzolamide 2%/timolol 0.5% Ophthalmic Solution 1 Drop(s) Both EYES two times a day  furosemide    Tablet 40 milliGRAM(s) Oral daily  gabapentin 300 milliGRAM(s) Oral daily  insulin glargine Injectable (LANTUS) 30 Unit(s) SubCutaneous at bedtime  insulin lispro (ADMELOG) corrective regimen sliding scale   SubCutaneous three times a day before meals  insulin lispro (ADMELOG) corrective regimen sliding scale   SubCutaneous at bedtime  insulin lispro Injectable (ADMELOG) 10 Unit(s) SubCutaneous three times a day before meals  latanoprost 0.005% Ophthalmic Solution 1 Drop(s) Both EYES at bedtime  levothyroxine 100 MICROGram(s) Oral daily  melatonin 5 milliGRAM(s) Oral at bedtime  ticagrelor 90 milliGRAM(s) Oral every 12 hours    MEDICATIONS  (PRN):  acetaminophen     Tablet .. 650 milliGRAM(s) Oral every 6 hours PRN Temp greater or equal to 38C (100.4F), Mild Pain (1 - 3)      Vital Signs Last 24 Hrs  T(C): 36.6 (25 May 2024 10:57), Max: 38 (24 May 2024 18:00)  T(F): 97.8 (25 May 2024 10:57), Max: 100.4 (24 May 2024 18:00)  HR: 70 (25 May 2024 10:57) (60 - 95)  BP: 134/71 (25 May 2024 10:57) (124/58 - 195/81)  BP(mean): 101 (25 May 2024 10:00) (84 - 116)  RR: 18 (25 May 2024 10:57) (14 - 26)  SpO2: 95% (25 May 2024 10:57) (93% - 100%)    Parameters below as of 25 May 2024 10:57  Patient On (Oxygen Delivery Method): room air      CAPILLARY BLOOD GLUCOSE      POCT Blood Glucose.: 286 mg/dL (25 May 2024 12:16)  POCT Blood Glucose.: 313 mg/dL (25 May 2024 11:18)  POCT Blood Glucose.: 374 mg/dL (25 May 2024 08:29)  POCT Blood Glucose.: 387 mg/dL (25 May 2024 05:54)  POCT Blood Glucose.: 280 mg/dL (24 May 2024 22:38)  POCT Blood Glucose.: 165 mg/dL (24 May 2024 17:17)    I&O's Summary    24 May 2024 07:01  -  25 May 2024 07:00  --------------------------------------------------------  IN: 448 mL / OUT: 5140 mL / NET: -4692 mL    25 May 2024 07:01  -  25 May 2024 13:45  --------------------------------------------------------  IN: 258 mL / OUT: 1400 mL / NET: -1142 mL        PHYSICAL EXAM:  GENERAL: NAD, well-developed  HEAD:  Atraumatic, Normocephalic  EYES: Redness to sclera   NECK: Supple, No JVD  CHEST/LUNG: Clear to auscultation bilaterally; No wheeze, chest wall with sternotomy scar   HEART: Regular rate and rhythm; No murmurs  ABDOMEN: Soft, Nontender, Nondistended  EXTREMITIES:  2+ Peripheral Pulses, No clubbing, cyanosis, or edema. LLE with scarring from CABG graft   NEUROLOGY: non-focal    LABS:                        12.8   10.21 )-----------( 161      ( 25 May 2024 05:10 )             36.6     05-25    130<L>  |  95<L>  |  31<H>  ----------------------------<  421<H>  4.0   |  21<L>  |  1.91<H>    Ca    9.6      25 May 2024 05:10  Phos  3.7     05-25  Mg     1.9     05-25    TPro  7.0  /  Alb  3.3  /  TBili  1.1  /  DBili  x   /  AST  65<H>  /  ALT  23  /  AlkPhos  73  05-25    PT/INR - ( 24 May 2024 10:30 )   PT: 10.3 sec;   INR: 0.86 ratio         PTT - ( 25 May 2024 05:10 )  PTT:79.7 sec  CARDIAC MARKERS ( 24 May 2024 12:31 )  x     / x     / 619 U/L / x     / 41.2 ng/mL      Urinalysis Basic - ( 25 May 2024 05:10 )    Color: x / Appearance: x / SG: x / pH: x  Gluc: 421 mg/dL / Ketone: x  / Bili: x / Urobili: x   Blood: x / Protein: x / Nitrite: x   Leuk Esterase: x / RBC: x / WBC x   Sq Epi: x / Non Sq Epi: x / Bacteria: x        RADIOLOGY & ADDITIONAL TESTS:    Imaging Personally Reviewed:    Consultant(s) Notes Reviewed:      Care Discussed with Consultants/Other Providers:

## 2024-05-25 NOTE — PROGRESS NOTE ADULT - PROBLEM SELECTOR PLAN 3
A1C this admission 8.4      - Insulin Sliding Scale   - Lantus 30u qHS and admelog 10 units pre-meals A1C this admission 8.4      - Insulin Sliding Scale   - Lantus 30u qHS and admelog 10 units pre-meals  - Hold home medications

## 2024-05-25 NOTE — PROGRESS NOTE ADULT - SUBJECTIVE AND OBJECTIVE BOX
CICU DAY NOTE  Admission date: 5/24/24  Chief complaint/ Diagnosis: STEMI   HPI: 65y/o M PMHx CABG in 2018, PCI x3 prior to 2018, CKD3, HTN, DM, HLD, presenting with chest pain and shortness of breathing. Patient BIBEMS to [facility]. EMS started patient on CPAP for hypoxia and increased WOB, and gave sublingual nitroglycerin x 2 and IV push lasix 60mg. Per ED note, patient with diffuse rales and wheezing and appearing volume overloaded. Patient received ASA, Brilinta, started on heparin gtt, and given 2g calcium due to elevated K+ on VBG. Patient transferred to [facility] for cardiac catheterization.   In cath lab, patient was evaluated, unable to tolerate lying flat and unable to be off BiPAP due to desaturation and tachycardia. Patient then transferred to CICU.   In CICU, patient alert, responsive, on BiPAP. Fs found to be >360, given 6u Admelog.   In [facility], labs notable for ProBNP > ~3800, K+ 6.5, AST 78, Cr 2.24, Lact 2.4. CXR shows evidence of pulmonary edema.  (24 May 2024 12:13)    Interval history: The patient and family demands to leave    REVIEW OF SYSTEMS  Denies CP, Palpitation, SOB, Dyspnea [X ] All other systems negative    MEDICATIONS  (STANDING):  aspirin  chewable 81 milliGRAM(s) Oral daily  atorvastatin 80 milliGRAM(s) Oral at bedtime  cilostazol 100 milliGRAM(s) Oral two times a day  furosemide    Tablet 40 milliGRAM(s) Oral daily  heparin  Infusion 900 Unit(s)/Hr (9 mL/Hr) IV Continuous <Continuous>  insulin glargine Injectable (LANTUS) 20 Unit(s) SubCutaneous at bedtime  insulin lispro (ADMELOG) corrective regimen sliding scale   SubCutaneous at bedtime  insulin lispro (ADMELOG) corrective regimen sliding scale   SubCutaneous three times a day before meals  levothyroxine 100 MICROGram(s) Oral daily  melatonin 5 milliGRAM(s) Oral at bedtime  ticagrelor 90 milliGRAM(s) Oral every 12 hours    Allergy   Combigan (Angioedema; Rash)  Combigan (Swelling)    ICU Vital Signs Last 24 Hrs  T(C): 36.7 (25 May 2024 07:00), Max: 38 (24 May 2024 18:00)  T(F): 98 (25 May 2024 07:00), Max: 100.4 (24 May 2024 18:00)  HR: 72 (25 May 2024 07:00) (53 - 95)  BP: 162/74 (25 May 2024 07:00) (124/58 - 195/81)  BP(mean): 106 (25 May 2024 07:00) (84 - 116)  RR: 20 (25 May 2024 07:00) (14 - 26)  SpO2: 100% (25 May 2024 07:00) (93% - 100%) on room air      I&O's Summary    24 May 2024 07:01  -  25 May 2024 07:00  --------------------------------------------------------  IN: 448 mL / OUT: 5140 mL / NET: -4692 mL    25 May 2024 07:01  -  25 May 2024 07:35  --------------------------------------------------------  IN: 9 mL / OUT: 0 mL / NET: 9 mL      PHYSICAL EXAM  Appearance: Normal, NAD  HEAD:  Atraumatic, Normocephalic  EYES: EOMI, PERRLA, conjunctiva and sclera clear  NECK: Supple, No JVD  CHEST/LUNG: Clear to auscultation bilaterally; No wheezing  HEART: Normal S1, S2. No murmurs, rubs, or gallops  ABDOMEN: + Bowel sounds, Soft, NT, ND   EXTREMITIES:  2+ Peripheral Pulses, No clubbing, cyanosis, or edema  NEUROLOGY: non-focal, aaox3  Lymphatic: No lymphadenopathy  SKIN: No rashes or lesions    Interpretation of Telemetry:                        12.8   10.21 )-----------( 161               36.6       130<L>  |  95<L>  |  31<H>  ----------------------------<  421<H>  4.0   |  21<L>  |  1.91<H>    Ca    9.6     Phos  3.7     Mg     1.9       TPro  7.0  /  Alb  3.3  /  TBili  1.1  /  DBili  x   /  AST  65<H>  /  ALT  23  /  AlkPhos  73   ABG - ( 24 May 2024 12:09 )  pH, Arterial: 7.32  /  pCO2: 33    /  pO2: 74    / HCO3: 17    / Base Excess: -8.1  /  SaO2: 94.2    F/S 165-387               CICU DAY NOTE  Admission date: 5/24/24  Chief complaint/ Diagnosis: STEMI   HPI: 63y/o M PMHx CABG in 2018, PCI x3 prior to 2018, CKD3, HTN, DM, HLD, presenting with chest pain and shortness of breathing. Patient BIBEMS to [facility]. EMS started patient on CPAP for hypoxia and increased WOB, and gave sublingual nitroglycerin x 2 and IV push lasix 60mg. Per ED note, patient with diffuse rales and wheezing and appearing volume overloaded. Patient received ASA, Brilinta, started on heparin gtt, and given 2g calcium due to elevated K+ on VBG. Patient transferred to [facility] for cardiac catheterization.   In cath lab, patient was evaluated, unable to tolerate lying flat and unable to be off BiPAP due to desaturation and tachycardia. Patient then transferred to CICU.   In CICU, patient alert, responsive, on BiPAP. Fs found to be >360, given 6u Admelog.   In [facility], labs notable for ProBNP > ~3800, K+ 6.5, AST 78, Cr 2.24, Lact 2.4. CXR shows evidence of pulmonary edema.  (24 May 2024 12:13)    Interval history: The patient and family demands to leave    REVIEW OF SYSTEMS  Denies CP, Palpitation, SOB, Dyspnea [X ] All other systems negative    MEDICATIONS  (STANDING):  aspirin  chewable 81 milliGRAM(s) Oral daily  atorvastatin 80 milliGRAM(s) Oral at bedtime  cilostazol 100 milliGRAM(s) Oral two times a day  furosemide    Tablet 40 milliGRAM(s) Oral daily  heparin  Infusion 900 Unit(s)/Hr (9 mL/Hr) IV Continuous <Continuous>  insulin glargine Injectable (LANTUS) 20 Unit(s) SubCutaneous at bedtime  insulin lispro (ADMELOG) corrective regimen sliding scale   SubCutaneous at bedtime  insulin lispro (ADMELOG) corrective regimen sliding scale   SubCutaneous three times a day before meals  levothyroxine 100 MICROGram(s) Oral daily  melatonin 5 milliGRAM(s) Oral at bedtime  ticagrelor 90 milliGRAM(s) Oral every 12 hours    Allergy   Combigan (Angioedema; Rash)  Combigan (Swelling)    ICU Vital Signs Last 24 Hrs  T(C): 36.7 (25 May 2024 07:00), Max: 38 (24 May 2024 18:00)  T(F): 98 (25 May 2024 07:00), Max: 100.4 (24 May 2024 18:00)  HR: 72 (25 May 2024 07:00) (53 - 95)  BP: 162/74 (25 May 2024 07:00) (124/58 - 195/81)  BP(mean): 106 (25 May 2024 07:00) (84 - 116)  RR: 20 (25 May 2024 07:00) (14 - 26)  SpO2: 100% (25 May 2024 07:00) (93% - 100%) on room air    I&O's Summary  IN: 448 mL / OUT: 5140 mL / NET: -4692 mL      PHYSICAL EXAM  Appearance: Normal, NAD  HEAD:  Atraumatic, Normocephalic  EYES: EOMI, PERRLA, conjunctiva and sclera clear  NECK: Supple, No JVD  CHEST/LUNG: Clear to auscultation bilaterally; No wheezing  HEART: Normal S1, S2. No murmurs, rubs, or gallops  ABDOMEN: + Bowel sounds, Soft, NT, ND   EXTREMITIES:  2+ Peripheral Pulses, No clubbing, cyanosis, or edema  NEUROLOGY: non-focal, aaox3  Lymphatic: No lymphadenopathy  SKIN: No rashes or lesions    Interpretation of Telemetry:                            12.8 <13.8  10.21<11.38 )-----------( 161                     36.6     130<L>  |  95<L>  |  31<H>  ----------------------------<  421<H>  4.0   |  21<L>  |  1.91<1.89    Ca    9.6     Phos  3.7     Mg     1.9       TPro  7.0  /  Alb  3.3  /  TBili  1.1  /  DBili  x   /  AST  65<H>  /  ALT  23  /  AlkPhos  73   ABG - ( 24 May 2024 12:09 )  pH, Arterial: 7.32  /  pCO2: 33    /  pO2: 74    / HCO3: 17    / Base Excess: -8.1  /  SaO2: 94.2    F/S 165-387

## 2024-05-25 NOTE — CHART NOTE - NSCHARTNOTEFT_GEN_A_CORE
CICU TRANSFER NOTE    63y/o M PMHx CABG in 2018, PCI x3 prior to 2018, CKD3, HTN, DM, HLD, presenting with chest pain and shortness of breathing. EKG showed STEMI pathology, patient transferred to cath lab for C, unable to tolerate lying flat. Patient transferred to CICU for further monitoring. Pt reports he missed his lasix and dm meds several days     NEURO:  #Mental status: baseline   -Currently A&O x 3    CV:  #STEMI (+EKG changes): Typical/atypical symptoms, loaded with 325 mg ASA, 180mg Ticagrelor, heparin bolus   - Revascularization (within 48hrs of symptoms) with PCI, unable to lie flat. Will optimize respiratory status and reach out to interventional cardiology for reattempt   - DAPT: ASA 81, Ticagrelor 90mg BID for 1 year   - Statin: atorvastatin 80mg qD     PULM:  #Pulmonary edema   Patient appears volume overloaded   - Aggressive diuresis with Lasix   - Now off oxygen and comfortable on room air w/ Ow sat>94%    RENAL:  #TRANG: Elevated Cr 2.2 (unknown baseline but as per daughter, he has a hx of stage III renal)   - Monitor BUN/Cr, UO and  I/Os     #Hyperkalemia   s/p shifted   - Monitor Lytes q12hrs     GI:  - DASH/Consistent carb diet     #Elevated AST   Likely 2/2 HF now improved   - Continue to monitor     ENDO: (A1c 8.0 on admission_) F/S 300'S  - Insulin Sliding Scale   - Lantus 30u qHS and amalog 10 units pre-meals     HEMATOLOGIC:  - SCD  - Ambulates as tolerated  - Start Hep subq this afternoon     ID: Mild leukocytosis   Likely 2/2 ACS and heart failure   No signs and symptoms of infection   - Monitor off abx   - Continue to trend     #R foot nail falling off   - Podiatry c/s when patient more stable     Pt will be transferred to tele floor.   His private Dr. Chelle Orellana ([facility]) CICU TRANSFER NOTE    65y/o M PMHx CABG in 2018, PCI x3 prior to 2018, CKD3, HTN, DM, HLD, presenting with chest pain and shortness of breathing. EKG showed STEMI pathology, patient transferred to cath lab for [facility], unable to tolerate lying flat. Patient transferred to CICU for further monitoring. Pt reports he missed his lasix and dm meds several days     NEURO:  #Mental status: baseline   -Currently A&O x 3    CV:  #STEMI (+EKG changes): Typical/atypical symptoms, loaded with 325 mg ASA, 180mg Ticagrelor, heparin bolus   - Revascularization (within 48hrs of symptoms) with PCI, unable to lie flat. Will optimize respiratory status and reach out to interventional cardiology for reattempt   - DAPT: ASA 81, Ticagrelor 90mg BID for 1 year   - Statin: atorvastatin 80mg qD     PULM:  #Pulmonary edema   Patient appears volume overloaded   - Aggressive diuresis with Lasix   - Now off oxygen and comfortable on room air w/ Ow sat>94%    RENAL:  #TRANG: Elevated Cr 2.2 (unknown baseline but as per daughter, he has a hx of stage III renal)   - Monitor BUN/Cr, UO and  I/Os     #Hyperkalemia   s/p shifted   - Monitor Lytes q12hrs     GI:  - DASH/Consistent carb diet     #Elevated AST   Likely 2/2 HF now improved   - Continue to monitor     ENDO: (A1c 8.0 on admission_) F/S 300'S  - Insulin Sliding Scale   - Lantus 30u qHS and amalog 10 units pre-meals     HEMATOLOGIC:  - SCD  - Ambulates as tolerated  - Start Hep subq this afternoon     ID: Mild leukocytosis   Likely 2/2 ACS and heart failure   No signs and symptoms of infection   - Monitor off abx   - Continue to trend     #R foot nail falling off   - Podiatry c/s when patient more stable     Pt will be transferred to tele floor.   His private Dr. Chelle Orellana ([facility]) Report given to Dr. Pathak and ACP CICU TRANSFER NOTE    65y/o M PMHx CABG in 2018, PCI x3 prior to 2018, CKD3, HTN, DM, HLD, presenting with chest pain and shortness of breathing. EKG showed STEMI pathology, patient transferred to cath lab for C, unable to tolerate lying flat. Patient transferred to CICU for further monitoring. Pt reports he missed his lasix and dm meds several days     NEURO:  #Mental status: baseline   -Currently A&O x 3    CV:  #STEMI (+EKG changes): Typical/atypical symptoms, loaded with 325 mg ASA, 180mg Ticagrelor, heparin bolus   - Revascularization (within 48hrs of symptoms) with PCI, unable to lie flat. Will optimize respiratory status and reach out to interventional cardiology for reattempt   - DAPT: ASA 81, Ticagrelor 90mg BID for 1 year   - Statin: atorvastatin 80mg qD     PULM:  #Pulmonary edema   Patient appears volume overloaded   - Aggressive diuresis with Lasix   - Now off oxygen and comfortable on room air w/ Ow sat>94%    RENAL:  #TRANG: Elevated Cr 2.2 (unknown baseline but as per daughter, he has a hx of stage III renal)   - Monitor BUN/Cr, UO and  I/Os     #Hyperkalemia   s/p shifted   - Monitor Lytes q12hrs     GI:  - DASH/Consistent carb diet     #Elevated AST   Likely 2/2 HF now improved   - Continue to monitor     ENDO: (A1c 8.0 on admission_) F/S 300'S  - Insulin Sliding Scale   - Lantus 30u qHS and amalog 10 units pre-meals     HEMATOLOGIC:  - SCD  - Ambulates as tolerated  - Start Hep subq this afternoon     ID: Mild leukocytosis   Likely 2/2 ACS and heart failure   No signs and symptoms of infection   - Monitor off abx   - Continue to trend     #R foot nail falling off   - Podiatry c/s when patient more stable     Pt will be transferred to tele floor.   His private Dr. Chelle Orellana ([facility]) Report given to Dr. Pathak and ACP  Extensive discussion provided to the family (daughter) at bedside by me and Dr. Le

## 2024-05-25 NOTE — PROGRESS NOTE ADULT - CRITICAL CARE ATTENDING COMMENT
ADHF found to have occlusion of LIMA-LAD from cath  Neuro: A/O x 3  Resp: Stable on RA, weaned off BiPAP  CVS: TTE with no WMA and normal LV function. Presented volume overload yesterday in the setting of ?renal failure. Suspect EKG changes and elevated troponins in the setting of volume overload rather than unstable plaque. s/p LHC with LIMA-LAD 90% stenosis. Plan for intervention next week with improvement in kidney function. c/w asa/brilinta and lipitor. Volume status significantly improved now on RA. c/w PO lasix. Start coreg 6.25mg BID  Renal: PO lasix as above, monitor for continued renal improvement  GI: PO diet as tolerated, bowel regimen PRN  ID: Monitor off abx  Heme: HSQ  Endo: Sugars elevated, no AG. insulin adjusted  No central access

## 2024-05-26 LAB
ALBUMIN SERPL ELPH-MCNC: 3.5 G/DL — SIGNIFICANT CHANGE UP (ref 3.3–5)
ALP SERPL-CCNC: 56 U/L — SIGNIFICANT CHANGE UP (ref 40–120)
ALT FLD-CCNC: 23 U/L — SIGNIFICANT CHANGE UP (ref 10–45)
ANION GAP SERPL CALC-SCNC: 13 MMOL/L — SIGNIFICANT CHANGE UP (ref 5–17)
APTT BLD: 33.7 SEC — SIGNIFICANT CHANGE UP (ref 24.5–35.6)
AST SERPL-CCNC: 48 U/L — HIGH (ref 10–40)
BILIRUB SERPL-MCNC: 1.4 MG/DL — HIGH (ref 0.2–1.2)
BUN SERPL-MCNC: 28 MG/DL — HIGH (ref 7–23)
CALCIUM SERPL-MCNC: 9.4 MG/DL — SIGNIFICANT CHANGE UP (ref 8.4–10.5)
CHLORIDE SERPL-SCNC: 99 MMOL/L — SIGNIFICANT CHANGE UP (ref 96–108)
CO2 SERPL-SCNC: 22 MMOL/L — SIGNIFICANT CHANGE UP (ref 22–31)
CREAT SERPL-MCNC: 1.8 MG/DL — HIGH (ref 0.5–1.3)
EGFR: 41 ML/MIN/1.73M2 — LOW
GLUCOSE BLDC GLUCOMTR-MCNC: 181 MG/DL — HIGH (ref 70–99)
GLUCOSE BLDC GLUCOMTR-MCNC: 184 MG/DL — HIGH (ref 70–99)
GLUCOSE BLDC GLUCOMTR-MCNC: 215 MG/DL — HIGH (ref 70–99)
GLUCOSE BLDC GLUCOMTR-MCNC: 246 MG/DL — HIGH (ref 70–99)
GLUCOSE SERPL-MCNC: 194 MG/DL — HIGH (ref 70–99)
HCT VFR BLD CALC: 39.4 % — SIGNIFICANT CHANGE UP (ref 39–50)
HGB BLD-MCNC: 13.7 G/DL — SIGNIFICANT CHANGE UP (ref 13–17)
INR BLD: 1.07 RATIO — SIGNIFICANT CHANGE UP (ref 0.85–1.18)
MAGNESIUM SERPL-MCNC: 2 MG/DL — SIGNIFICANT CHANGE UP (ref 1.6–2.6)
MCHC RBC-ENTMCNC: 28.8 PG — SIGNIFICANT CHANGE UP (ref 27–34)
MCHC RBC-ENTMCNC: 34.8 GM/DL — SIGNIFICANT CHANGE UP (ref 32–36)
MCV RBC AUTO: 82.9 FL — SIGNIFICANT CHANGE UP (ref 80–100)
MRSA PCR RESULT.: SIGNIFICANT CHANGE UP
NRBC # BLD: 0 /100 WBCS — SIGNIFICANT CHANGE UP (ref 0–0)
PHOSPHATE SERPL-MCNC: 4.9 MG/DL — HIGH (ref 2.5–4.5)
PLATELET # BLD AUTO: 184 K/UL — SIGNIFICANT CHANGE UP (ref 150–400)
POTASSIUM SERPL-MCNC: 3.8 MMOL/L — SIGNIFICANT CHANGE UP (ref 3.5–5.3)
POTASSIUM SERPL-SCNC: 3.8 MMOL/L — SIGNIFICANT CHANGE UP (ref 3.5–5.3)
PROT SERPL-MCNC: 7.3 G/DL — SIGNIFICANT CHANGE UP (ref 6–8.3)
PROTHROM AB SERPL-ACNC: 11.8 SEC — SIGNIFICANT CHANGE UP (ref 9.5–13)
RBC # BLD: 4.75 M/UL — SIGNIFICANT CHANGE UP (ref 4.2–5.8)
RBC # FLD: 12.8 % — SIGNIFICANT CHANGE UP (ref 10.3–14.5)
S AUREUS DNA NOSE QL NAA+PROBE: SIGNIFICANT CHANGE UP
SODIUM SERPL-SCNC: 134 MMOL/L — LOW (ref 135–145)
WBC # BLD: 9.13 K/UL — SIGNIFICANT CHANGE UP (ref 3.8–10.5)
WBC # FLD AUTO: 9.13 K/UL — SIGNIFICANT CHANGE UP (ref 3.8–10.5)

## 2024-05-26 PROCEDURE — 99232 SBSQ HOSP IP/OBS MODERATE 35: CPT | Mod: GC

## 2024-05-26 RX ORDER — INSULIN GLARGINE 100 [IU]/ML
35 INJECTION, SOLUTION SUBCUTANEOUS AT BEDTIME
Refills: 0 | Status: DISCONTINUED | OUTPATIENT
Start: 2024-05-26 | End: 2024-05-27

## 2024-05-26 RX ORDER — POLYETHYLENE GLYCOL 3350 17 G/17G
17 POWDER, FOR SOLUTION ORAL DAILY
Refills: 0 | Status: DISCONTINUED | OUTPATIENT
Start: 2024-05-26 | End: 2024-05-29

## 2024-05-26 RX ORDER — SENNA PLUS 8.6 MG/1
2 TABLET ORAL AT BEDTIME
Refills: 0 | Status: DISCONTINUED | OUTPATIENT
Start: 2024-05-26 | End: 2024-05-29

## 2024-05-26 RX ORDER — CHLORHEXIDINE GLUCONATE 213 G/1000ML
1 SOLUTION TOPICAL DAILY
Refills: 0 | Status: DISCONTINUED | OUTPATIENT
Start: 2024-05-26 | End: 2024-05-29

## 2024-05-26 RX ADMIN — Medication 1 DROP(S): at 13:05

## 2024-05-26 RX ADMIN — TICAGRELOR 90 MILLIGRAM(S): 90 TABLET ORAL at 05:14

## 2024-05-26 RX ADMIN — ATORVASTATIN CALCIUM 80 MILLIGRAM(S): 80 TABLET, FILM COATED ORAL at 21:47

## 2024-05-26 RX ADMIN — Medication 1 DROP(S): at 05:15

## 2024-05-26 RX ADMIN — CHLORHEXIDINE GLUCONATE 1 APPLICATION(S): 213 SOLUTION TOPICAL at 11:46

## 2024-05-26 RX ADMIN — DORZOLAMIDE HYDROCHLORIDE TIMOLOL MALEATE 1 DROP(S): 20; 5 SOLUTION/ DROPS OPHTHALMIC at 17:46

## 2024-05-26 RX ADMIN — CARVEDILOL PHOSPHATE 6.25 MILLIGRAM(S): 80 CAPSULE, EXTENDED RELEASE ORAL at 17:02

## 2024-05-26 RX ADMIN — Medication 5 MILLIGRAM(S): at 23:44

## 2024-05-26 RX ADMIN — TICAGRELOR 90 MILLIGRAM(S): 90 TABLET ORAL at 17:45

## 2024-05-26 RX ADMIN — Medication 10 UNIT(S): at 07:58

## 2024-05-26 RX ADMIN — Medication 10 UNIT(S): at 11:47

## 2024-05-26 RX ADMIN — INSULIN GLARGINE 35 UNIT(S): 100 INJECTION, SOLUTION SUBCUTANEOUS at 21:46

## 2024-05-26 RX ADMIN — Medication 40 MILLIGRAM(S): at 05:14

## 2024-05-26 RX ADMIN — Medication 1 DROP(S): at 21:46

## 2024-05-26 RX ADMIN — Medication 10 UNIT(S): at 17:44

## 2024-05-26 RX ADMIN — Medication 81 MILLIGRAM(S): at 13:06

## 2024-05-26 RX ADMIN — POLYETHYLENE GLYCOL 3350 17 GRAM(S): 17 POWDER, FOR SOLUTION ORAL at 11:43

## 2024-05-26 RX ADMIN — LATANOPROST 1 DROP(S): 0.05 SOLUTION/ DROPS OPHTHALMIC; TOPICAL at 21:46

## 2024-05-26 RX ADMIN — Medication 1: at 07:58

## 2024-05-26 RX ADMIN — CILOSTAZOL 100 MILLIGRAM(S): 100 TABLET ORAL at 05:14

## 2024-05-26 RX ADMIN — Medication 2: at 17:44

## 2024-05-26 RX ADMIN — HEPARIN SODIUM 5000 UNIT(S): 5000 INJECTION INTRAVENOUS; SUBCUTANEOUS at 21:46

## 2024-05-26 RX ADMIN — Medication 1: at 11:44

## 2024-05-26 RX ADMIN — DORZOLAMIDE HYDROCHLORIDE TIMOLOL MALEATE 1 DROP(S): 20; 5 SOLUTION/ DROPS OPHTHALMIC at 05:15

## 2024-05-26 RX ADMIN — HEPARIN SODIUM 5000 UNIT(S): 5000 INJECTION INTRAVENOUS; SUBCUTANEOUS at 05:14

## 2024-05-26 RX ADMIN — SENNA PLUS 2 TABLET(S): 8.6 TABLET ORAL at 21:47

## 2024-05-26 RX ADMIN — HEPARIN SODIUM 5000 UNIT(S): 5000 INJECTION INTRAVENOUS; SUBCUTANEOUS at 13:06

## 2024-05-26 RX ADMIN — GABAPENTIN 300 MILLIGRAM(S): 400 CAPSULE ORAL at 13:06

## 2024-05-26 RX ADMIN — Medication 100 MICROGRAM(S): at 05:14

## 2024-05-26 RX ADMIN — CARVEDILOL PHOSPHATE 6.25 MILLIGRAM(S): 80 CAPSULE, EXTENDED RELEASE ORAL at 05:14

## 2024-05-26 NOTE — CONSULT NOTE ADULT - ASSESSMENT
64y old Male with history of T2DM, CKD, HTN, CABG, Hypothyroidism presenting with STEMI.    1. T2DM with hyperglycemia  - He is not well controlled outpatient based on his a1c of 8.4%  - Increase Lantus to 35 units at night due to fasting hyperglycemia  - Continue Admelog 10 units with meals   - continue low Admelog correctional scale before meals and bedtime  - Monitor FS before meals and bedtime  - Follow hospital hypoglycemic protocol    2. Hypothyroidism  - TSH normal  - Continue Levothyroxine 100 mcg daily    3. STEMI  - with history of CABG, awaiting PCI until respiratory status improves  - would also benefit from GLP1 agonist/SGLT2 inhibitor outpatient    4. CKD  - eGFR stable in the 40s  - monitor for hypoglycemia    Will continue to follow.    Reza Vanegas MD  [facility]- Division of Endocrinology    [address] [place] [address]    T [phone]  F [phone]

## 2024-05-26 NOTE — PROGRESS NOTE ADULT - SUBJECTIVE AND OBJECTIVE BOX
DATE OF SERVICE: 05-26-24 @ 10:25    Patient is a 64y old  Male who presents with a chief complaint of STEMI (26 May 2024 06:52)      SUBJECTIVE / OVERNIGHT EVENTS: No acute events overnight. No events on Tele, SR. Patient pending repeat C, will touch base with cardiology regarding timing. Lantus increased to 35 units. Patient denies any chest pain, abdominal pain or shortness of breath. He endorses some abdominal pressure and states he has not had a bowel movement in 3 days. Small hematoma to right groin, will continue to monitor.     MEDICATIONS  (STANDING):  artificial  tears Solution 1 Drop(s) Both EYES three times a day  aspirin  chewable 81 milliGRAM(s) Oral daily  atorvastatin 80 milliGRAM(s) Oral at bedtime  carvedilol 6.25 milliGRAM(s) Oral every 12 hours  chlorhexidine 2% Cloths 1 Application(s) Topical daily  cilostazol 100 milliGRAM(s) Oral two times a day  dorzolamide 2%/timolol 0.5% Ophthalmic Solution 1 Drop(s) Both EYES two times a day  furosemide    Tablet 40 milliGRAM(s) Oral daily  gabapentin 300 milliGRAM(s) Oral daily  heparin   Injectable 5000 Unit(s) SubCutaneous every 8 hours  insulin glargine Injectable (LANTUS) 35 Unit(s) SubCutaneous at bedtime  insulin lispro (ADMELOG) corrective regimen sliding scale   SubCutaneous at bedtime  insulin lispro (ADMELOG) corrective regimen sliding scale   SubCutaneous three times a day before meals  insulin lispro Injectable (ADMELOG) 10 Unit(s) SubCutaneous three times a day before meals  latanoprost 0.005% Ophthalmic Solution 1 Drop(s) Both EYES at bedtime  levothyroxine 100 MICROGram(s) Oral daily  melatonin 5 milliGRAM(s) Oral at bedtime  Netarsudil (Rhopressa) 0.02% opth soln 1 Drop(s) 1 Drop(s) Both EYES at bedtime  ticagrelor 90 milliGRAM(s) Oral every 12 hours    MEDICATIONS  (PRN):  acetaminophen     Tablet .. 650 milliGRAM(s) Oral every 6 hours PRN Temp greater or equal to 38C (100.4F), Mild Pain (1 - 3)      Vital Signs Last 24 Hrs  T(C): 36.5 (25 May 2024 20:01), Max: 37 (25 May 2024 16:20)  T(F): 97.7 (25 May 2024 20:01), Max: 98.6 (25 May 2024 16:20)  HR: 66 (25 May 2024 20:01) (66 - 75)  BP: 142/71 (25 May 2024 20:01) (134/71 - 142/72)  BP(mean): --  RR: 18 (25 May 2024 20:01) (18 - 18)  SpO2: 98% (25 May 2024 20:01) (95% - 98%)    Parameters below as of 25 May 2024 20:01  Patient On (Oxygen Delivery Method): room air      CAPILLARY BLOOD GLUCOSE      POCT Blood Glucose.: 184 mg/dL (26 May 2024 07:27)  POCT Blood Glucose.: 168 mg/dL (25 May 2024 21:28)  POCT Blood Glucose.: 161 mg/dL (25 May 2024 17:16)  POCT Blood Glucose.: 286 mg/dL (25 May 2024 12:16)  POCT Blood Glucose.: 313 mg/dL (25 May 2024 11:18)    I&O's Summary    25 May 2024 07:01  -  26 May 2024 07:00  --------------------------------------------------------  IN: 698 mL / OUT: 2250 mL / NET: -1552 mL    26 May 2024 07:01  -  26 May 2024 10:25  --------------------------------------------------------  IN: 0 mL / OUT: 400 mL / NET: -400 mL        PHYSICAL EXAM:  GENERAL: NAD, well-developed  HEAD:  Atraumatic, Normocephalic  EYES: Redness to sclera   NECK: Supple, No JVD  CHEST/LUNG: Clear to auscultation bilaterally; No wheeze, chest wall with sternotomy scar   HEART: Regular rate and rhythm; No murmurs  ABDOMEN: Soft, Nontender, Nondistended  EXTREMITIES:  2+ Peripheral Pulses, No clubbing, cyanosis, or edema. LLE with scarring from CABG graft, small hematoma to right groin   NEUROLOGY: non-focal    LABS:                        13.7   9.13  )-----------( 184      ( 26 May 2024 04:37 )             39.4     05-26    134<L>  |  99  |  28<H>  ----------------------------<  194<H>  3.8   |  22  |  1.80<H>    Ca    9.4      26 May 2024 04:37  Phos  4.9     05-26  Mg     2.0     05-26    TPro  7.3  /  Alb  3.5  /  TBili  1.4<H>  /  DBili  x   /  AST  48<H>  /  ALT  23  /  AlkPhos  56  05-26    PT/INR - ( 26 May 2024 04:37 )   PT: 11.8 sec;   INR: 1.07 ratio         PTT - ( 26 May 2024 04:37 )  PTT:33.7 sec  CARDIAC MARKERS ( 24 May 2024 12:31 )  x     / x     / 619 U/L / x     / 41.2 ng/mL      Urinalysis Basic - ( 26 May 2024 04:37 )    Color: x / Appearance: x / SG: x / pH: x  Gluc: 194 mg/dL / Ketone: x  / Bili: x / Urobili: x   Blood: x / Protein: x / Nitrite: x   Leuk Esterase: x / RBC: x / WBC x   Sq Epi: x / Non Sq Epi: x / Bacteria: x        RADIOLOGY & ADDITIONAL TESTS:    Imaging Personally Reviewed:    Consultant(s) Notes Reviewed:      Care Discussed with Consultants/Other Providers:

## 2024-05-26 NOTE — PROGRESS NOTE ADULT - ATTENDING COMMENTS
63y/o M PMHx CABG in 2018, PCI x3 prior to 2018, CKD3, HTN, DM, HLD, presenting with chest pain and shortness of breathing. EKG showed STEMI pathology, patient transferred to cath lab for [facility], but unable to tolerate lying flat. Patient transferred to CICU for further monitoring on BiPAP, now weaned to room air and transferred to medicine service pending repeat cath.     1. STEMI: 90% stenosis of LIMA-LAD. No intervention performed as patient not able to tolerate laying flat. DAPT: ASA 81, Ticagrelor 90mg BID. Will need to find out if insurance covers Brilinta. Cath planned for 5/28. Small hematoma at right groin. Monitor for thrill.   2. DM2: A1c 8.4. Endocrine following. [id number]  3. CKD likely 2/2 DM. Monitor creatinine  4. HTN/ HLD continue with Carvedilol 6.25mg bid  5. Constipation, continue with MIRLAX qd, uptitrate to regular BM    Plan discussed with pt, wife and daughter (via phone). They understand plan for cath and possible stent.

## 2024-05-26 NOTE — PROGRESS NOTE ADULT - PROBLEM SELECTOR PLAN 3
A1C this admission 8.4      - Insulin Sliding Scale   - Lantus 35u qHS and admelog 10 units pre-meals  - Hold home medications  - Endocrine following

## 2024-05-26 NOTE — CONSULT NOTE ADULT - SUBJECTIVE AND OBJECTIVE BOX
[facility] Initial Consult Note    HPI  64y old Male with history of T2DM, CKD, HTN, CABG, Hypothyroidism presenting with STEMI.    History was obtained from patient, his wife and chart.    Diabetes history: T2DM for more than 10 years and on insulin for the last few years  Home regimen: Lantus 30 units at night, Janumet  mg daily and Nateglinide 60 mg daily  Home FS: usually 100s  A1C: 8.4% on 5/25  Outpatient endocrinologist: none, PCP manages  Complications: CABG ,CKD    Vital Signs Last 24 Hrs  T(C): 36.5 (05-25-24 @ 20:01), Max: 37 (05-25-24 @ 16:20)  HR: 66 (05-25-24 @ 20:01) (66 - 79)  BP: 142/71 (05-25-24 @ 20:01) (134/71 - 162/74)  RR: 18 (05-25-24 @ 20:01) (18 - 24)  SpO2: 98% (05-25-24 @ 20:01) (95% - 100%)    Physical Exam  Gen: NAD, alert, awake, wife at bedside  HEENT: NC/AT, moist mucous membranes  Chest: normal respiratory effort  Heart: +S1 S2  Neuro: normal mood and affect    Medications  acetaminophen     Tablet .. 650 milliGRAM(s) Oral every 6 hours PRN  artificial  tears Solution 1 Drop(s) Both EYES three times a day  aspirin  chewable 81 milliGRAM(s) Oral daily  atorvastatin 80 milliGRAM(s) Oral at bedtime  carvedilol 6.25 milliGRAM(s) Oral every 12 hours  cilostazol 100 milliGRAM(s) Oral two times a day  dorzolamide 2%/timolol 0.5% Ophthalmic Solution 1 Drop(s) Both EYES two times a day  furosemide    Tablet 40 milliGRAM(s) Oral daily  gabapentin 300 milliGRAM(s) Oral daily  heparin   Injectable 5000 Unit(s) SubCutaneous every 8 hours  insulin glargine Injectable (LANTUS) 35 Unit(s) SubCutaneous at bedtime  insulin lispro (ADMELOG) corrective regimen sliding scale   SubCutaneous three times a day before meals  insulin lispro (ADMELOG) corrective regimen sliding scale   SubCutaneous at bedtime  insulin lispro Injectable (ADMELOG) 10 Unit(s) SubCutaneous three times a day before meals  latanoprost 0.005% Ophthalmic Solution 1 Drop(s) Both EYES at bedtime  levothyroxine 100 MICROGram(s) Oral daily  melatonin 5 milliGRAM(s) Oral at bedtime  Netarsudil (Rhopressa) 0.02% opth soln 1 Drop(s) 1 Drop(s) Both EYES at bedtime  ticagrelor 90 milliGRAM(s) Oral every 12 hours    Pertinent labs/imaging  POCT Blood Glucose.: 168 mg/dL (05-25-24 @ 21:28)  POCT Blood Glucose.: 161 mg/dL (05-25-24 @ 17:16)  POCT Blood Glucose.: 286 mg/dL (05-25-24 @ 12:16)  POCT Blood Glucose.: 313 mg/dL (05-25-24 @ 11:18)  POCT Blood Glucose.: 374 mg/dL (05-25-24 @ 08:29)    eGFR: 41 mL/min/1.73m2 (05-26-24 @ 04:37)  eGFR: 38 mL/min/1.73m2 (05-25-24 @ 05:10)  eGFR: 38 mL/min/1.73m2 (05-24-24 @ 14:58)  eGFR: 39 mL/min/1.73m2 (05-24-24 @ 12:31)  eGFR: 32 mL/min/1.73m2 (05-24-24 @ 10:30)   [facility] Initial Consult Note    HPI  64y old Male with history of T2DM, CKD, HTN, CABG, Hypothyroidism presenting with STEMI.    History was obtained from patient, his wife and chart.    Diabetes history: T2DM for more than 10 years and on insulin for the last few years  Home regimen: Lantus 30 units at night, Janumet  mg daily and Nateglinide 60 mg daily  Home FS: usually 100s  A1C: 8.4% on 5/25  Outpatient endocrinologist: none, PCP manages  Complications: CABG ,CKD    Also with history of hypothyroidism on Levothyroxine 100 mcg daily.    Vital Signs Last 24 Hrs  T(C): 36.5 (05-25-24 @ 20:01), Max: 37 (05-25-24 @ 16:20)  HR: 66 (05-25-24 @ 20:01) (66 - 79)  BP: 142/71 (05-25-24 @ 20:01) (134/71 - 162/74)  RR: 18 (05-25-24 @ 20:01) (18 - 24)  SpO2: 98% (05-25-24 @ 20:01) (95% - 100%)    Physical Exam  Gen: NAD, alert, awake, wife at bedside  HEENT: NC/AT, moist mucous membranes  Chest: normal respiratory effort  Heart: +S1 S2  Neuro: normal mood and affect    Medications  acetaminophen     Tablet .. 650 milliGRAM(s) Oral every 6 hours PRN  artificial  tears Solution 1 Drop(s) Both EYES three times a day  aspirin  chewable 81 milliGRAM(s) Oral daily  atorvastatin 80 milliGRAM(s) Oral at bedtime  carvedilol 6.25 milliGRAM(s) Oral every 12 hours  cilostazol 100 milliGRAM(s) Oral two times a day  dorzolamide 2%/timolol 0.5% Ophthalmic Solution 1 Drop(s) Both EYES two times a day  furosemide    Tablet 40 milliGRAM(s) Oral daily  gabapentin 300 milliGRAM(s) Oral daily  heparin   Injectable 5000 Unit(s) SubCutaneous every 8 hours  insulin glargine Injectable (LANTUS) 35 Unit(s) SubCutaneous at bedtime  insulin lispro (ADMELOG) corrective regimen sliding scale   SubCutaneous three times a day before meals  insulin lispro (ADMELOG) corrective regimen sliding scale   SubCutaneous at bedtime  insulin lispro Injectable (ADMELOG) 10 Unit(s) SubCutaneous three times a day before meals  latanoprost 0.005% Ophthalmic Solution 1 Drop(s) Both EYES at bedtime  levothyroxine 100 MICROGram(s) Oral daily  melatonin 5 milliGRAM(s) Oral at bedtime  Netarsudil (Rhopressa) 0.02% opth soln 1 Drop(s) 1 Drop(s) Both EYES at bedtime  ticagrelor 90 milliGRAM(s) Oral every 12 hours    Pertinent labs/imaging  POCT Blood Glucose.: 168 mg/dL (05-25-24 @ 21:28)  POCT Blood Glucose.: 161 mg/dL (05-25-24 @ 17:16)  POCT Blood Glucose.: 286 mg/dL (05-25-24 @ 12:16)  POCT Blood Glucose.: 313 mg/dL (05-25-24 @ 11:18)  POCT Blood Glucose.: 374 mg/dL (05-25-24 @ 08:29)    eGFR: 41 mL/min/1.73m2 (05-26-24 @ 04:37)  eGFR: 38 mL/min/1.73m2 (05-25-24 @ 05:10)  eGFR: 38 mL/min/1.73m2 (05-24-24 @ 14:58)  eGFR: 39 mL/min/1.73m2 (05-24-24 @ 12:31)  eGFR: 32 mL/min/1.73m2 (05-24-24 @ 10:30)

## 2024-05-27 LAB
ALBUMIN SERPL ELPH-MCNC: 3.6 G/DL — SIGNIFICANT CHANGE UP (ref 3.3–5)
ALP SERPL-CCNC: 55 U/L — SIGNIFICANT CHANGE UP (ref 40–120)
ALT FLD-CCNC: 23 U/L — SIGNIFICANT CHANGE UP (ref 10–45)
ANION GAP SERPL CALC-SCNC: 13 MMOL/L — SIGNIFICANT CHANGE UP (ref 5–17)
APTT BLD: 127.8 SEC — CRITICAL HIGH (ref 24.5–35.6)
AST SERPL-CCNC: 31 U/L — SIGNIFICANT CHANGE UP (ref 10–40)
BASOPHILS # BLD AUTO: 0.02 K/UL — SIGNIFICANT CHANGE UP (ref 0–0.2)
BASOPHILS NFR BLD AUTO: 0.3 % — SIGNIFICANT CHANGE UP (ref 0–2)
BILIRUB SERPL-MCNC: 1.2 MG/DL — SIGNIFICANT CHANGE UP (ref 0.2–1.2)
BUN SERPL-MCNC: 29 MG/DL — HIGH (ref 7–23)
CALCIUM SERPL-MCNC: 9.2 MG/DL — SIGNIFICANT CHANGE UP (ref 8.4–10.5)
CHLORIDE SERPL-SCNC: 100 MMOL/L — SIGNIFICANT CHANGE UP (ref 96–108)
CK MB CFR SERPL CALC: 2.6 NG/ML — SIGNIFICANT CHANGE UP (ref 0–6.7)
CO2 SERPL-SCNC: 22 MMOL/L — SIGNIFICANT CHANGE UP (ref 22–31)
CREAT SERPL-MCNC: 1.69 MG/DL — HIGH (ref 0.5–1.3)
EGFR: 44 ML/MIN/1.73M2 — LOW
EOSINOPHIL # BLD AUTO: 0.41 K/UL — SIGNIFICANT CHANGE UP (ref 0–0.5)
EOSINOPHIL NFR BLD AUTO: 5.2 % — SIGNIFICANT CHANGE UP (ref 0–6)
GLUCOSE BLDC GLUCOMTR-MCNC: 158 MG/DL — HIGH (ref 70–99)
GLUCOSE BLDC GLUCOMTR-MCNC: 194 MG/DL — HIGH (ref 70–99)
GLUCOSE BLDC GLUCOMTR-MCNC: 244 MG/DL — HIGH (ref 70–99)
GLUCOSE BLDC GLUCOMTR-MCNC: 325 MG/DL — HIGH (ref 70–99)
GLUCOSE SERPL-MCNC: 154 MG/DL — HIGH (ref 70–99)
HCT VFR BLD CALC: 38.2 % — LOW (ref 39–50)
HCT VFR BLD CALC: 39.3 % — SIGNIFICANT CHANGE UP (ref 39–50)
HGB BLD-MCNC: 13.2 G/DL — SIGNIFICANT CHANGE UP (ref 13–17)
HGB BLD-MCNC: 13.6 G/DL — SIGNIFICANT CHANGE UP (ref 13–17)
IMM GRANULOCYTES NFR BLD AUTO: 0.8 % — SIGNIFICANT CHANGE UP (ref 0–0.9)
LYMPHOCYTES # BLD AUTO: 1.8 K/UL — SIGNIFICANT CHANGE UP (ref 1–3.3)
LYMPHOCYTES # BLD AUTO: 22.7 % — SIGNIFICANT CHANGE UP (ref 13–44)
MAGNESIUM SERPL-MCNC: 2 MG/DL — SIGNIFICANT CHANGE UP (ref 1.6–2.6)
MCHC RBC-ENTMCNC: 28.5 PG — SIGNIFICANT CHANGE UP (ref 27–34)
MCHC RBC-ENTMCNC: 29.2 PG — SIGNIFICANT CHANGE UP (ref 27–34)
MCHC RBC-ENTMCNC: 34.6 GM/DL — SIGNIFICANT CHANGE UP (ref 32–36)
MCHC RBC-ENTMCNC: 34.6 GM/DL — SIGNIFICANT CHANGE UP (ref 32–36)
MCV RBC AUTO: 82.5 FL — SIGNIFICANT CHANGE UP (ref 80–100)
MCV RBC AUTO: 84.3 FL — SIGNIFICANT CHANGE UP (ref 80–100)
MONOCYTES # BLD AUTO: 1.23 K/UL — HIGH (ref 0–0.9)
MONOCYTES NFR BLD AUTO: 15.5 % — HIGH (ref 2–14)
NEUTROPHILS # BLD AUTO: 4.41 K/UL — SIGNIFICANT CHANGE UP (ref 1.8–7.4)
NEUTROPHILS NFR BLD AUTO: 55.5 % — SIGNIFICANT CHANGE UP (ref 43–77)
NRBC # BLD: 0 /100 WBCS — SIGNIFICANT CHANGE UP (ref 0–0)
NRBC # BLD: 0 /100 WBCS — SIGNIFICANT CHANGE UP (ref 0–0)
PHOSPHATE SERPL-MCNC: 4 MG/DL — SIGNIFICANT CHANGE UP (ref 2.5–4.5)
PLATELET # BLD AUTO: 189 K/UL — SIGNIFICANT CHANGE UP (ref 150–400)
PLATELET # BLD AUTO: 209 K/UL — SIGNIFICANT CHANGE UP (ref 150–400)
POTASSIUM SERPL-MCNC: 3.6 MMOL/L — SIGNIFICANT CHANGE UP (ref 3.5–5.3)
POTASSIUM SERPL-SCNC: 3.6 MMOL/L — SIGNIFICANT CHANGE UP (ref 3.5–5.3)
PROT SERPL-MCNC: 7 G/DL — SIGNIFICANT CHANGE UP (ref 6–8.3)
RBC # BLD: 4.63 M/UL — SIGNIFICANT CHANGE UP (ref 4.2–5.8)
RBC # BLD: 4.66 M/UL — SIGNIFICANT CHANGE UP (ref 4.2–5.8)
RBC # FLD: 12.8 % — SIGNIFICANT CHANGE UP (ref 10.3–14.5)
RBC # FLD: 13 % — SIGNIFICANT CHANGE UP (ref 10.3–14.5)
SODIUM SERPL-SCNC: 135 MMOL/L — SIGNIFICANT CHANGE UP (ref 135–145)
TROPONIN T, HIGH SENSITIVITY RESULT: 1766 NG/L — HIGH (ref 0–51)
WBC # BLD: 7.93 K/UL — SIGNIFICANT CHANGE UP (ref 3.8–10.5)
WBC # BLD: 9.06 K/UL — SIGNIFICANT CHANGE UP (ref 3.8–10.5)
WBC # FLD AUTO: 7.93 K/UL — SIGNIFICANT CHANGE UP (ref 3.8–10.5)
WBC # FLD AUTO: 9.06 K/UL — SIGNIFICANT CHANGE UP (ref 3.8–10.5)

## 2024-05-27 PROCEDURE — 93308 TTE F-UP OR LMTD: CPT | Mod: 26

## 2024-05-27 PROCEDURE — 99233 SBSQ HOSP IP/OBS HIGH 50: CPT | Mod: GC

## 2024-05-27 PROCEDURE — 99223 1ST HOSP IP/OBS HIGH 75: CPT

## 2024-05-27 PROCEDURE — 93010 ELECTROCARDIOGRAM REPORT: CPT

## 2024-05-27 RX ORDER — ACETAMINOPHEN 500 MG
975 TABLET ORAL ONCE
Refills: 0 | Status: COMPLETED | OUTPATIENT
Start: 2024-05-27 | End: 2024-05-27

## 2024-05-27 RX ORDER — NATEGLINIDE 60 MG/1
1 TABLET, COATED ORAL
Refills: 0 | DISCHARGE

## 2024-05-27 RX ORDER — INSULIN LISPRO 100/ML
VIAL (ML) SUBCUTANEOUS
Refills: 0 | Status: DISCONTINUED | OUTPATIENT
Start: 2024-05-27 | End: 2024-05-29

## 2024-05-27 RX ORDER — NETARSUDIL 0.2 MG/ML
1 SOLUTION/ DROPS OPHTHALMIC; TOPICAL
Refills: 0 | DISCHARGE

## 2024-05-27 RX ORDER — SITAGLIPTIN AND METFORMIN HYDROCHLORIDE 500; 50 MG/1; MG/1
1 TABLET, FILM COATED ORAL
Refills: 0 | DISCHARGE

## 2024-05-27 RX ORDER — BRIMONIDINE TARTRATE 2 MG/MG
1 SOLUTION/ DROPS OPHTHALMIC
Refills: 0 | DISCHARGE

## 2024-05-27 RX ORDER — LEVOTHYROXINE SODIUM 125 MCG
1 TABLET ORAL
Refills: 0 | DISCHARGE

## 2024-05-27 RX ORDER — NITROGLYCERIN 6.5 MG
0.4 CAPSULE, EXTENDED RELEASE ORAL
Refills: 0 | Status: DISCONTINUED | OUTPATIENT
Start: 2024-05-27 | End: 2024-05-29

## 2024-05-27 RX ORDER — LATANOPROST 0.05 MG/ML
1 SOLUTION/ DROPS OPHTHALMIC; TOPICAL
Refills: 0 | DISCHARGE

## 2024-05-27 RX ORDER — INSULIN LISPRO 100/ML
10 VIAL (ML) SUBCUTANEOUS
Refills: 0 | Status: DISCONTINUED | OUTPATIENT
Start: 2024-05-28 | End: 2024-05-28

## 2024-05-27 RX ORDER — ASPIRIN/CALCIUM CARB/MAGNESIUM 324 MG
1 TABLET ORAL
Refills: 0 | DISCHARGE

## 2024-05-27 RX ORDER — FUROSEMIDE 40 MG
1 TABLET ORAL
Refills: 0 | DISCHARGE

## 2024-05-27 RX ORDER — METOPROLOL TARTRATE 50 MG
1 TABLET ORAL
Refills: 0 | DISCHARGE

## 2024-05-27 RX ORDER — NITROGLYCERIN 6.5 MG
0.4 CAPSULE, EXTENDED RELEASE ORAL ONCE
Refills: 0 | Status: DISCONTINUED | OUTPATIENT
Start: 2024-05-27 | End: 2024-05-27

## 2024-05-27 RX ORDER — HEPARIN SODIUM 5000 [USP'U]/ML
INJECTION INTRAVENOUS; SUBCUTANEOUS
Qty: 25000 | Refills: 0 | Status: DISCONTINUED | OUTPATIENT
Start: 2024-05-27 | End: 2024-05-28

## 2024-05-27 RX ORDER — INSULIN LISPRO 100/ML
13 VIAL (ML) SUBCUTANEOUS
Refills: 0 | Status: DISCONTINUED | OUTPATIENT
Start: 2024-05-28 | End: 2024-05-29

## 2024-05-27 RX ORDER — INSULIN LISPRO 100/ML
10 VIAL (ML) SUBCUTANEOUS
Refills: 0 | Status: DISCONTINUED | OUTPATIENT
Start: 2024-05-28 | End: 2024-05-29

## 2024-05-27 RX ORDER — DORZOLAMIDE HYDROCHLORIDE TIMOLOL MALEATE 20; 5 MG/ML; MG/ML
1 SOLUTION/ DROPS OPHTHALMIC
Refills: 0 | DISCHARGE

## 2024-05-27 RX ORDER — INSULIN GLARGINE 100 [IU]/ML
38 INJECTION, SOLUTION SUBCUTANEOUS AT BEDTIME
Refills: 0 | Status: DISCONTINUED | OUTPATIENT
Start: 2024-05-27 | End: 2024-05-28

## 2024-05-27 RX ORDER — DOCUSATE SODIUM 100 MG
1 CAPSULE ORAL
Refills: 0 | DISCHARGE

## 2024-05-27 RX ORDER — EZETIMIBE 10 MG/1
1 TABLET ORAL
Refills: 0 | DISCHARGE

## 2024-05-27 RX ADMIN — Medication 81 MILLIGRAM(S): at 11:42

## 2024-05-27 RX ADMIN — Medication 1 DROP(S): at 05:37

## 2024-05-27 RX ADMIN — SENNA PLUS 2 TABLET(S): 8.6 TABLET ORAL at 21:36

## 2024-05-27 RX ADMIN — Medication 2: at 17:14

## 2024-05-27 RX ADMIN — Medication 8: at 11:40

## 2024-05-27 RX ADMIN — Medication 10 UNIT(S): at 17:14

## 2024-05-27 RX ADMIN — Medication 1 DROP(S): at 14:02

## 2024-05-27 RX ADMIN — Medication 10 UNIT(S): at 11:40

## 2024-05-27 RX ADMIN — Medication 100 MICROGRAM(S): at 05:37

## 2024-05-27 RX ADMIN — Medication 975 MILLIGRAM(S): at 13:30

## 2024-05-27 RX ADMIN — CHLORHEXIDINE GLUCONATE 1 APPLICATION(S): 213 SOLUTION TOPICAL at 11:40

## 2024-05-27 RX ADMIN — DORZOLAMIDE HYDROCHLORIDE TIMOLOL MALEATE 1 DROP(S): 20; 5 SOLUTION/ DROPS OPHTHALMIC at 05:37

## 2024-05-27 RX ADMIN — Medication 4: at 07:52

## 2024-05-27 RX ADMIN — HEPARIN SODIUM 5000 UNIT(S): 5000 INJECTION INTRAVENOUS; SUBCUTANEOUS at 05:38

## 2024-05-27 RX ADMIN — POLYETHYLENE GLYCOL 3350 17 GRAM(S): 17 POWDER, FOR SOLUTION ORAL at 11:39

## 2024-05-27 RX ADMIN — HEPARIN SODIUM 900 UNIT(S)/HR: 5000 INJECTION INTRAVENOUS; SUBCUTANEOUS at 12:41

## 2024-05-27 RX ADMIN — Medication 10 UNIT(S): at 07:51

## 2024-05-27 RX ADMIN — Medication 5 MILLIGRAM(S): at 21:36

## 2024-05-27 RX ADMIN — INSULIN GLARGINE 38 UNIT(S): 100 INJECTION, SOLUTION SUBCUTANEOUS at 21:37

## 2024-05-27 RX ADMIN — HEPARIN SODIUM 700 UNIT(S)/HR: 5000 INJECTION INTRAVENOUS; SUBCUTANEOUS at 20:01

## 2024-05-27 RX ADMIN — Medication 1 DROP(S): at 21:37

## 2024-05-27 RX ADMIN — HEPARIN SODIUM 0 UNIT(S)/HR: 5000 INJECTION INTRAVENOUS; SUBCUTANEOUS at 18:58

## 2024-05-27 RX ADMIN — Medication 975 MILLIGRAM(S): at 12:32

## 2024-05-27 RX ADMIN — TICAGRELOR 90 MILLIGRAM(S): 90 TABLET ORAL at 17:29

## 2024-05-27 RX ADMIN — DORZOLAMIDE HYDROCHLORIDE TIMOLOL MALEATE 1 DROP(S): 20; 5 SOLUTION/ DROPS OPHTHALMIC at 17:14

## 2024-05-27 RX ADMIN — CARVEDILOL PHOSPHATE 6.25 MILLIGRAM(S): 80 CAPSULE, EXTENDED RELEASE ORAL at 17:14

## 2024-05-27 RX ADMIN — GABAPENTIN 300 MILLIGRAM(S): 400 CAPSULE ORAL at 11:39

## 2024-05-27 RX ADMIN — TICAGRELOR 90 MILLIGRAM(S): 90 TABLET ORAL at 05:38

## 2024-05-27 RX ADMIN — ATORVASTATIN CALCIUM 80 MILLIGRAM(S): 80 TABLET, FILM COATED ORAL at 21:37

## 2024-05-27 RX ADMIN — Medication 40 MILLIGRAM(S): at 05:37

## 2024-05-27 RX ADMIN — CARVEDILOL PHOSPHATE 6.25 MILLIGRAM(S): 80 CAPSULE, EXTENDED RELEASE ORAL at 05:37

## 2024-05-27 RX ADMIN — LATANOPROST 1 DROP(S): 0.05 SOLUTION/ DROPS OPHTHALMIC; TOPICAL at 21:37

## 2024-05-27 NOTE — PHYSICAL THERAPY INITIAL EVALUATION ADULT - PERTINENT HX OF CURRENT PROBLEM, REHAB EVAL
65y/o M PMHx CABG in 2018, PCI x3 prior to 2018, CKD3, HTN, DM, HLD, presenting with chest pain and shortness of breathing. Patient BIBEMS to [facility]. EMS started patient on CPAP for hypoxia and increased WOB, and gave sublingual nitroglycerin x 2 and IV push lasix 60mg. Per ED note, patient with diffuse rales and wheezing and appearing volume overloaded. Patient received ASA, Brilinta, started on heparin gtt, and given 2g calcium due to elevated K+ on VBG. Patient transferred to [facility] for cardiac catheterization. Hosp course: XR chest (5/24) Pulmonary venous congestion. Small loculated left pleural effusion with adjacent passive atelectasis. Trace right pleural effusion

## 2024-05-27 NOTE — PROGRESS NOTE ADULT - ATTENDING COMMENTS
Agree with plan as outlined above.  MI - unable to lie flat  Continue ACS treatment  Angiography today/tomorrow  Please obtain serial ekgs, ckmb and trops today     B Rodriguez                                                                        Forty-1 Minutes Spent in Patient Management

## 2024-05-27 NOTE — PHYSICAL THERAPY INITIAL EVALUATION ADULT - SITTING BALANCE: STATIC
RN Note 1330 10 min  Pt emailed this writer his FMLA paperwork to be completed by provider. This writer forwarded paperwork to Dr. Migue Barajas, Angeles Kim NP and Oswaldo Foreman [facility].. Angeles replied and said she will complete paperwork with pt next week when she meets with pt. This writer notified pt of this.  SONIA RdzN, RN-BC     good balance

## 2024-05-27 NOTE — PROGRESS NOTE ADULT - ASSESSMENT
65y/o M PMHx CABG in 2018, PCI x3 prior to 2018, CKD3, HTN, DM, HLD, presenting with chest pain and shortness of breathing. EKG showed STEMI pathology, patient transferred to cath lab for LHC, unable to tolerate lying flat. Did have LIMA-LAD blockage on diagnostic cath, but no intervention on account of TRANG. Patient transferred to CICU for further monitoring, now downgraded to floors.   EKG: SR with 2mmSTE in aVR, reciprocal downsloping STD in the inferolateral leads  TTE w/ EF 59%, mild MR    A/P;  - Continue aspirin brillinta  - Continue atorva 80  - Continue carvedilol 6.25mg q12h  - Holding on Acei/arb pending angiogram, and in light of TRANG. Anticipate [facility] with intervention tomorrow 65y/o M PMHx CABG in 2018, PCI x3 prior to 2018, CKD3, HTN, DM, HLD, presenting with chest pain and shortness of breathing. EKG showed STEMI pathology, patient transferred to cath lab for LHC, unable to tolerate lying flat. Did have LIMA-LAD blockage on diagnostic cath, but no intervention on account of TRANG. Patient transferred to CICU for further monitoring, now downgraded to floors.   EKG: SR with 2mmSTE in aVR, reciprocal downsloping STD in the inferolateral leads  TTE w/ EF 59%, mild MR    A/P;  - Continue aspirin brillinta  - Continue atorva 80  - Continue carvedilol 6.25mg q12h  - Holding on Acei/arb pending angiogram, and in light of TRANG. Anticipate [facility] with intervention tomorrow. Discussed with interventional cardiology about timing of cath, given initial stemi criteria and now improved creatinine, resolved orthopnea. Plan for Tuesday with Dr. Arboleda on account of lesion complexity (non-flow limiting 90% of LIMA to LAD) 65y/o M PMHx CABG in 2018, PCI x3 prior to 2018, CKD3, HTN, DM, HLD, presenting with chest pain and shortness of breathing. EKG showed STEMI pathology, patient transferred to cath lab for [facility], unable to tolerate lying flat. Did have LIMA-LAD blockage on diagnostic cath, but no intervention on account of TRANG. Patient transferred to CICU for further monitoring, now downgraded to floors.   EKG: SR with 2mmSTE in aVR, reciprocal downsloping STD in the inferolateral leads  TTE w/ EF 59%, mild MR    A/P;  - Continue aspirin brillinta  - Continue atorva 80  - Continue carvedilol 6.25mg q12h  - Holding on Acei/arb pending angiogram, and in light of TRANG. Discussed with interventional cardiology ([place]) about timing of cath, given initial stemi criteria and now improved creatinine, resolved orthopnea. Plan for Tuesday with Dr. Arboleda on account of lesion complexity, diffuse disease (noted non-flow limiting 90% of LIMA to LAD) and possible need for support  - SLN. If refractory, nitro gtt, and would re-visit with cath lab about IABP for perfusion 63y/o M PMHx CABG in 2018, PCI x3 prior to 2018, CKD3, HTN, DM, HLD, presenting with chest pain and shortness of breathing. EKG showed STEMI pathology, patient transferred to cath lab for [facility], unable to tolerate lying flat. Did have LIMA-LAD blockage on diagnostic cath, but no intervention on account of TRANG. Patient transferred to CICU for further monitoring, now downgraded to floors.   EKG: SR with 2mmSTE in aVR, reciprocal downsloping STD in the inferolateral leads  TTE w/ EF 59%, mild MR    A/P;  - Continue aspirin brillinta  - Continue atorva 80  - Continue carvedilol 6.25mg q12h  - Obtain Stat troponin and CKMB  - Holding on Acei/arb pending angiogram, and in light of TRANG. Discussed with interventional cardiology ([place]) about timing of cath, given initial stemi criteria and now improved creatinine, resolved orthopnea. Plan for Tuesday with Dr. Arboleda on account of lesion complexity, diffuse disease (noted non-flow limiting 90% of LIMA to LAD) and possible need for support  - SLN. If refractory, nitro gtt, and would re-visit with cath lab about IABP for perfusion

## 2024-05-27 NOTE — PHYSICAL THERAPY INITIAL EVALUATION ADULT - RISK REDUCTION/PREVENTION, PT EVAL
Patient:   BRYCE TAYLOR            MRN: [id number]            FIN: [id number]              Age:   70 years     Sex:  MALE     :  45   Associated Diagnoses:   None   Author:   ADRIANA, SEE SHELDON     Subjective   On 2L NC  (-) 156 cc  labs reviewed     Health Status   Allergies:    Allergies (1) Active Reaction  penicillin None Documented    Current medications: Medications (20) Active  Scheduled: (15)  Aspirin 81 mg DR tab  81 mg 1 tab, Oral, Daily  Brimonidine 0.2% ophth soln 5 mL  1 drop, Each Eye, BID  Budesonide 0.5 mg/2 mL nebulizer susp  0.5 mg 2 mL, Nebulizer, Daily  Calcium carbonate 500 mg chew tab [Ca 200 mg]  500 mg 1 tab, Oral, TID  Ergocalciferol 50,000 unit (1.25 mg) cap  50,000 unit 1 cap, Oral, Q2 Weeks  Formoterol 20 mcg/2 mL nebulizer soln  20 mcg 2 mL, Nebulizer, Q12H  Furosemide 20 mg/2 mL inj SDV  40 mg 4 mL, Slow IV Push, Daily  Guaifenesin--30 mg CR tab  1 tab, Oral, Q12H  Heparin 5,000 unit/1 mL inj MDV  5,000 unit 1 mL, Subcutaneous, Q8H  Latanoprost 0.005% ophth soln 2.5 mL  1 drop, Each Eye, Q Bedtime  Magnesium oxide 400 mg tab [Mg 240 mg]  800 mg 2 tab, Oral, QID  Multivitamin-minerals therapeutic tab  1 tab, Oral, Daily  Pantoprazole 40 mg DR tab  40 mg 1 tab, Oral, BID  Pravastatin 40 mg tab  80 mg 2 tab, Oral, Q Bedtime  TACROlimus 0.5 mg cap  1.5 mg 3 cap, Oral, Q12H  Continuous: (0)  PRN: (5)  Acetaminophen 500 mg tab  500 mg 1 tab, Oral, BID  Albuterol HFA 90 mcg oral MDI 8 gm  180 mcg 2 puff, MDI/DPI, Q6H  Benzonatate 100 mg cap  200 mg 2 cap, Oral, QID  Guaifenesin--20 mg/10 mL oral liquid UD  5 mL, Oral, Q4H  Hydrocodone-acetaminophen 5-325 mg tab  1 tab, Oral, Q6H        Objective   Intake and Output   I & O between:  2020 09:15 TO 2020 09:15  Med Dosing Weight:  93.4  kg   2020  24 Hour Intake:   1244.00  ( 13.32 mL/kg )  24 Hour Output:   930.00           24 Hour Urine/Stool Output:   0.0  24 Hour Balance:   314.00           24  Hour Urine Output:   930.00  ( 0.41 mL/kg/hr )     VS/Measurements     Vitals between:   12-JUN-2020 09:15:45   TO   13-JUN-2020 09:15:45                   LAST RESULT MINIMUM MAXIMUM  Temperature 36.5 36.4 36.5  Heart Rate 98 87 114  Respiratory Rate 23 13 27  NISBP           94 91 126  NIDBP           66 53 87  NIMBP           72 67 94  SpO2                    98 91 100    Lines and Tubes:    LINES  Peripheral Intravenous Hand Right   Gauge: 20   Charted: 06/13/20 09:12  Inserted: 06/07/20   Days Since Insertion: 6 days  Indication of Use: Saline Lock  Peripheral Intravenous Hand Left   Gauge: 20   Charted: 06/13/20 09:12  Inserted: 06/08/20   Days Since Insertion: 5 days  Indication of Use: Saline Lock  Peripheral Intravenous Forearm Left   Gauge: 20   Charted: 06/13/20 09:12  Inserted: 06/09/20   Days Since Insertion: 4 days  Indication of Use: Saline Lock   .    General:  Alert and oriented, No acute distress.    Eye:  Pupils are equal, round and reactive to light.    Neck:  No carotid bruit, No jugular venous distention.    Respiratory:  Lungs are clear to auscultation, Respirations are non-labored, Breath sounds are equal.   Cardiovascular:  Normal rate, Regular rhythm, Good pulses equal in all extremities.   Gastrointestinal:  Soft, Non-tender, Normal bowel sounds.    Integumentary:  Warm, Dry.    Neurologic:  Alert, Oriented, Normal sensory, Normal motor function.      Results Review   Labs between:  12-JUN-2020 09:15 to 13-JUN-2020 09:15  CBC:                 WBC  HgB  Hct  Plt  MCV  RDW   13-JUN-2020 5.1  (L) 11.6  (L) 37.4  232  89.9  (H) 15.1   DIFF:                 Seg  Neutroph//ABS  Lymph//ABS  Mono//ABS  EOS/ABS  13-JUN-2020 NOT APPLICABLE  54 // 2.8 29 // 1.5 13 // 0.6 3 // 0.2  BMP:                 Na  Cl  BUN  Glu   13-JUN-2020 139  106  20  (H) 123                              K  CO2  Cr  Ca                              4.2  30  0.82  9.0   CMP:                 AST  ALT  AlkPhos  Bili  Albumin    13-JUN-2020 26  53  90  0.4  (L) 2.6   Other Chem:             Mg  Phos  Triglycerides  GGTP  DirectBili                           2.0                                Impression and Plan   70-year-old gentleman with heart failure status post OHD 2011, COPD, recently diagnosed squamous cell cancer status post right upper lobectomy 5/5/2020 admitted with progressive dyspnea and cough since recent discharge.  Noted to have partial right upper lung collapse and bilateral patchy groundglass opacities.  Assessment  #Acute on chronic hypoxemic respiratory failure  -Oxygen requirement  improved today  -HRCT reviewed. Persistent but slight improvement in bibasilar ground glass changes.  -? pneumonitis/drug reaction  -Follow-up imaging study.  -We will continue attempts at weaning oxygen requirements.  -Maximize therapy for COPD.  -?  Sirolimus toxicity however off currently. Also timeline of hyopoxemia does not correlate  -If continues to worsen or not improve may warrant open lung biopsy.  -If okay with ID can trial a short course of steroid if no clinical improvement. D/w dr Pulliam who wishes to hold off on steroids for now.  -Suspect significant pulmonary impairment associated with open lung biopsy if needed.  -Follow-up culture results.   -Attempt to keep net negative.  -S/p bronch.  Follow-up culture results  -CXR reviewed  #Abnormal CT chest  -CT chest PE negative for acute PE.  #Squamous cell lung cancer status post right upper lobectomy 5/5/2020  #Suspected pneumonia, mucous plugging  #Anemia  #Chronic heart failure status post OHD 2011  #Immunocompromised status  #COPD without acute exacerbation  Isamar Clarke M.D.  Pulmonary and Critical Care Attending   risk factors/secondary impairments

## 2024-05-27 NOTE — PHYSICAL THERAPY INITIAL EVALUATION ADULT - NSPTDMEREC_GEN_A_CORE
Pt will require a rolling walker at home due to diagnosis of STEMI causing decreased balance and unsteadiness during ambulation to help complete their MRADLs./rolling walker

## 2024-05-27 NOTE — PROVIDER CONTACT NOTE (CRITICAL VALUE NOTIFICATION) - ACTION/TREATMENT ORDERED:
Provider aware, followed nomogram as ordered. Heparin was paused for an hour and restarted at 7cc/hr. Next PTT due at 01:31am on 5/28. Plan of care continues.

## 2024-05-27 NOTE — PROGRESS NOTE ADULT - SUBJECTIVE AND OBJECTIVE BOX
[facility] Progress Note    MAR, chart notes, fingersticks and labs reviewed    Subjective: no events    Objective  Vital Signs  T(C): 36.6 (05-27-24 @ 04:32), Max: 36.6 (05-26-24 @ 20:13)  HR: 67 (05-27-24 @ 04:32) (67 - 78)  BP: 128/70 (05-27-24 @ 04:32) (117/69 - 165/81)  RR: 18 (05-27-24 @ 04:32) (18 - 18)  SpO2: 96% (05-27-24 @ 04:32) (96% - 98%)    Physical Exam  Gen: NAD, resting comfortably  HEENT: NC/AT  Neck: supple  Chest: breathing comfortably  Heart: +s1 +s2    Medications  acetaminophen     Tablet .. 650 milliGRAM(s) Oral every 6 hours PRN  artificial  tears Solution 1 Drop(s) Both EYES three times a day  aspirin  chewable 81 milliGRAM(s) Oral daily  atorvastatin 80 milliGRAM(s) Oral at bedtime  carvedilol 6.25 milliGRAM(s) Oral every 12 hours  chlorhexidine 2% Cloths 1 Application(s) Topical daily  dorzolamide 2%/timolol 0.5% Ophthalmic Solution 1 Drop(s) Both EYES two times a day  furosemide    Tablet 40 milliGRAM(s) Oral daily  gabapentin 300 milliGRAM(s) Oral daily  heparin   Injectable 5000 Unit(s) SubCutaneous every 8 hours  insulin glargine Injectable (LANTUS) 35 Unit(s) SubCutaneous at bedtime  insulin lispro (ADMELOG) corrective regimen sliding scale   SubCutaneous three times a day before meals  insulin lispro (ADMELOG) corrective regimen sliding scale   SubCutaneous at bedtime  insulin lispro Injectable (ADMELOG) 10 Unit(s) SubCutaneous three times a day before meals  latanoprost 0.005% Ophthalmic Solution 1 Drop(s) Both EYES at bedtime  levothyroxine 100 MICROGram(s) Oral daily  melatonin 5 milliGRAM(s) Oral at bedtime  Netarsudil (Rhopressa) 0.02% opth soln 1 Drop(s) 1 Drop(s) Both EYES at bedtime  polyethylene glycol 3350 17 Gram(s) Oral daily  senna 2 Tablet(s) Oral at bedtime  ticagrelor 90 milliGRAM(s) Oral every 12 hours    Pertinent labs/Imaging  POCT Blood Glucose.: 215 mg/dL (05-26-24 @ 21:07)  POCT Blood Glucose.: 246 mg/dL (05-26-24 @ 17:01)  POCT Blood Glucose.: 181 mg/dL (05-26-24 @ 11:36)  POCT Blood Glucose.: 184 mg/dL (05-26-24 @ 07:27)    eGFR: 41 mL/min/1.73m2 (05-26-24 @ 04:37)

## 2024-05-27 NOTE — PHYSICAL THERAPY INITIAL EVALUATION ADULT - ADDITIONAL COMMENTS
Pt resides in a private home with his family, +1 steps to enter. PTA, pt ambulated with straight cane and assist and required assistance for ADLs

## 2024-05-27 NOTE — PROVIDER CONTACT NOTE (CRITICAL VALUE NOTIFICATION) - ASSESSMENT
Patient is A&OX2-3 Thai speaking. VSS. NO complaints of any chest pain, SOB, palpitations. No signs of active bleeding

## 2024-05-27 NOTE — PROGRESS NOTE ADULT - SUBJECTIVE AND OBJECTIVE BOX
DATE OF SERVICE: 05-27-24 @ 08:47    Patient is a 64y old  Male who presents with a chief complaint of STEMI (27 May 2024 07:59)      SUBJECTIVE / OVERNIGHT EVENTS: No acute events overnight. Plan for repeat [facility] tomorrow. Patient denies any chest pain, shortness of breath or abdominal pain. Right groin hematoma stable, no thrill.     MEDICATIONS  (STANDING):  artificial  tears Solution 1 Drop(s) Both EYES three times a day  aspirin  chewable 81 milliGRAM(s) Oral daily  atorvastatin 80 milliGRAM(s) Oral at bedtime  carvedilol 6.25 milliGRAM(s) Oral every 12 hours  chlorhexidine 2% Cloths 1 Application(s) Topical daily  dorzolamide 2%/timolol 0.5% Ophthalmic Solution 1 Drop(s) Both EYES two times a day  furosemide    Tablet 40 milliGRAM(s) Oral daily  gabapentin 300 milliGRAM(s) Oral daily  heparin   Injectable 5000 Unit(s) SubCutaneous every 8 hours  insulin glargine Injectable (LANTUS) 35 Unit(s) SubCutaneous at bedtime  insulin lispro (ADMELOG) corrective regimen sliding scale   SubCutaneous at bedtime  insulin lispro (ADMELOG) corrective regimen sliding scale   SubCutaneous three times a day before meals  insulin lispro Injectable (ADMELOG) 10 Unit(s) SubCutaneous three times a day before meals  latanoprost 0.005% Ophthalmic Solution 1 Drop(s) Both EYES at bedtime  levothyroxine 100 MICROGram(s) Oral daily  melatonin 5 milliGRAM(s) Oral at bedtime  Netarsudil (Rhopressa) 0.02% opth soln 1 Drop(s) 1 Drop(s) Both EYES at bedtime  polyethylene glycol 3350 17 Gram(s) Oral daily  senna 2 Tablet(s) Oral at bedtime  ticagrelor 90 milliGRAM(s) Oral every 12 hours    MEDICATIONS  (PRN):  acetaminophen     Tablet .. 650 milliGRAM(s) Oral every 6 hours PRN Temp greater or equal to 38C (100.4F), Mild Pain (1 - 3)      Vital Signs Last 24 Hrs  T(C): 36.6 (27 May 2024 04:32), Max: 36.6 (26 May 2024 20:13)  T(F): 97.9 (27 May 2024 04:32), Max: 97.9 (27 May 2024 04:32)  HR: 67 (27 May 2024 04:32) (67 - 78)  BP: 128/70 (27 May 2024 04:32) (117/69 - 165/81)  BP(mean): --  RR: 18 (27 May 2024 04:32) (18 - 18)  SpO2: 96% (27 May 2024 04:32) (96% - 98%)    Parameters below as of 27 May 2024 04:32  Patient On (Oxygen Delivery Method): room air      CAPILLARY BLOOD GLUCOSE      POCT Blood Glucose.: 244 mg/dL (27 May 2024 07:25)  POCT Blood Glucose.: 215 mg/dL (26 May 2024 21:07)  POCT Blood Glucose.: 246 mg/dL (26 May 2024 17:01)  POCT Blood Glucose.: 181 mg/dL (26 May 2024 11:36)    I&O's Summary    26 May 2024 07:01  -  27 May 2024 07:00  --------------------------------------------------------  IN: 1130 mL / OUT: 2225 mL / NET: -1095 mL    27 May 2024 07:01  -  27 May 2024 08:47  --------------------------------------------------------  IN: 0 mL / OUT: 250 mL / NET: -250 mL        PHYSICAL EXAM:  GENERAL: NAD, well-developed  HEAD:  Atraumatic, Normocephalic  EYES: Redness to sclera   NECK: Supple, No JVD  CHEST/LUNG: Clear to auscultation bilaterally; No wheeze, chest wall with sternotomy scar   HEART: Regular rate and rhythm; No murmurs  ABDOMEN: Soft, Nontender, Nondistended  EXTREMITIES:  2+ Peripheral Pulses, No clubbing, cyanosis, or edema. LLE with scarring from CABG graft, small hematoma to right groin, right large toenail falling off  NEUROLOGY: non-focal    LABS:                        13.2   7.93  )-----------( 189      ( 27 May 2024 06:58 )             38.2     05-27    135  |  100  |  29<H>  ----------------------------<  154<H>  3.6   |  22  |  1.69<H>    Ca    9.2      27 May 2024 07:24  Phos  4.0     05-27  Mg     2.0     05-27    TPro  7.0  /  Alb  3.6  /  TBili  1.2  /  DBili  x   /  AST  31  /  ALT  23  /  AlkPhos  55  05-27    PT/INR - ( 26 May 2024 04:37 )   PT: 11.8 sec;   INR: 1.07 ratio         PTT - ( 26 May 2024 04:37 )  PTT:33.7 sec      Urinalysis Basic - ( 27 May 2024 07:24 )    Color: x / Appearance: x / SG: x / pH: x  Gluc: 154 mg/dL / Ketone: x  / Bili: x / Urobili: x   Blood: x / Protein: x / Nitrite: x   Leuk Esterase: x / RBC: x / WBC x   Sq Epi: x / Non Sq Epi: x / Bacteria: x        RADIOLOGY & ADDITIONAL TESTS:    Imaging Personally Reviewed:    Consultant(s) Notes Reviewed:      Care Discussed with Consultants/Other Providers:

## 2024-05-27 NOTE — PROGRESS NOTE ADULT - SUBJECTIVE AND OBJECTIVE BOX
Vernon Blevins MD  Cardiology Fellow  [phone]  All Cardiology service information can be found 24/7 on [email or web address], password: [email or web address]    Patient seen and examined at bedside.  AF HR 60s-70s BP 110s-160s/60s-80s satting 96-98% Ra. TEle: NSR   Net neg 1.095L (1130/2225)  152.7 > 147.4lb (standing weights)  7.93 < 13.2 > 189  Cre downtrend from 2.24 > 1.80    Review Of Systems: No chest pain, shortness of breath, or palpitations            Current Meds:  acetaminophen     Tablet .. 650 milliGRAM(s) Oral every 6 hours PRN  artificial  tears Solution 1 Drop(s) Both EYES three times a day  aspirin  chewable 81 milliGRAM(s) Oral daily  atorvastatin 80 milliGRAM(s) Oral at bedtime  carvedilol 6.25 milliGRAM(s) Oral every 12 hours  chlorhexidine 2% Cloths 1 Application(s) Topical daily  dorzolamide 2%/timolol 0.5% Ophthalmic Solution 1 Drop(s) Both EYES two times a day  furosemide    Tablet 40 milliGRAM(s) Oral daily  gabapentin 300 milliGRAM(s) Oral daily  heparin   Injectable 5000 Unit(s) SubCutaneous every 8 hours  insulin glargine Injectable (LANTUS) 35 Unit(s) SubCutaneous at bedtime  insulin lispro (ADMELOG) corrective regimen sliding scale   SubCutaneous at bedtime  insulin lispro (ADMELOG) corrective regimen sliding scale   SubCutaneous three times a day before meals  insulin lispro Injectable (ADMELOG) 10 Unit(s) SubCutaneous three times a day before meals  latanoprost 0.005% Ophthalmic Solution 1 Drop(s) Both EYES at bedtime  levothyroxine 100 MICROGram(s) Oral daily  melatonin 5 milliGRAM(s) Oral at bedtime  Netarsudil (Rhopressa) 0.02% opth soln 1 Drop(s) 1 Drop(s) Both EYES at bedtime  polyethylene glycol 3350 17 Gram(s) Oral daily  senna 2 Tablet(s) Oral at bedtime  ticagrelor 90 milliGRAM(s) Oral every 12 hours      Vitals:  T(F): 97.9 (05-27), Max: 97.9 (05-27)  HR: 67 (05-27) (67 - 78)  BP: 128/70 (05-27) (117/69 - 165/81)  RR: 18 (05-27)  SpO2: 96% (05-27)  I&O's Summary    26 May 2024 07:01  -  27 May 2024 07:00  --------------------------------------------------------  IN: 1130 mL / OUT: 2225 mL / NET: -1095 mL        Physical Exam:  Appearance: Normal, NAD  HEAD:  Atraumatic, Normocephalic  EYES: EOMI, PERRLA, conjunctiva and sclera clear  NECK: Supple, No JVD  CHEST/LUNG: Clear to auscultation bilaterally; No wheezing  HEART: Normal S1, S2. No murmurs, rubs, or gallops  ABDOMEN: + Bowel sounds, Soft, NT, ND   EXTREMITIES:  2+ Peripheral Pulses, No clubbing, cyanosis, or edema  NEUROLOGY: non-focal, aaox3  Lymphatic: No lymphadenopathy  SKIN: No rashes or lesions                          13.2   7.93  )-----------( 189      ( 27 May 2024 06:58 )             38.2     05-26    134<L>  |  99  |  28<H>  ----------------------------<  194<H>  3.8   |  22  |  1.80<H>    Ca    9.4      26 May 2024 04:37  Phos  4.9     05-26  Mg     2.0     05-26    TPro  7.3  /  Alb  3.5  /  TBili  1.4<H>  /  DBili  x   /  AST  48<H>  /  ALT  23  /  AlkPhos  56  05-26    PT/INR - ( 26 May 2024 04:37 )   PT: 11.8 sec;   INR: 1.07 ratio         PTT - ( 26 May 2024 04:37 )  PTT:33.7 sec  CARDIAC MARKERS ( 24 May 2024 12:31 )  1978 ng/L / x     / x     / 619 U/L / x     / 41.2 ng/mL    TTE  CONCLUSIONS:      1. Left ventricular systolic function is normal with an ejection fraction of 59 % by Quintero's method of disks. There are no regional wall motion abnormalities seen.   2. Normal right ventricular cavity size, with normal wall thickness, and probably normal systolic function.   3. Mild mitral regurgitation.   4. Limited study      No regional wall motion abnornality or flail mitral valve.   5. No prior echocardiogram is available for comparison.   6. There is normal LV mass and concentric remodeling.     Vernon Blevins MD  Cardiology Fellow  [phone]  All Cardiology service information can be found 24/7 on [email or web address], password: [email or web address]    Patient seen and examined at bedside.  Accompanied by wife at bedside. No overnight events. No orthopnea.  AF HR 60s-70s BP 110s-160s/60s-80s satting 96-98% Ra. Currently 120s/70s. TEle: NSR   Net neg 1.095L (1130/2224)  152.7 > 147.4lb (standing weights)  7.93 < 13.2 > 189  Cre downtrend from 2.24 > 1.80    Review Of Systems: No chest pain, shortness of breath, or palpitations            Current Meds:  acetaminophen     Tablet .. 650 milliGRAM(s) Oral every 6 hours PRN  artificial  tears Solution 1 Drop(s) Both EYES three times a day  aspirin  chewable 81 milliGRAM(s) Oral daily  atorvastatin 80 milliGRAM(s) Oral at bedtime  carvedilol 6.25 milliGRAM(s) Oral every 12 hours  chlorhexidine 2% Cloths 1 Application(s) Topical daily  dorzolamide 2%/timolol 0.5% Ophthalmic Solution 1 Drop(s) Both EYES two times a day  furosemide    Tablet 40 milliGRAM(s) Oral daily  gabapentin 300 milliGRAM(s) Oral daily  heparin   Injectable 5000 Unit(s) SubCutaneous every 8 hours  insulin glargine Injectable (LANTUS) 35 Unit(s) SubCutaneous at bedtime  insulin lispro (ADMELOG) corrective regimen sliding scale   SubCutaneous at bedtime  insulin lispro (ADMELOG) corrective regimen sliding scale   SubCutaneous three times a day before meals  insulin lispro Injectable (ADMELOG) 10 Unit(s) SubCutaneous three times a day before meals  latanoprost 0.005% Ophthalmic Solution 1 Drop(s) Both EYES at bedtime  levothyroxine 100 MICROGram(s) Oral daily  melatonin 5 milliGRAM(s) Oral at bedtime  Netarsudil (Rhopressa) 0.02% opth soln 1 Drop(s) 1 Drop(s) Both EYES at bedtime  polyethylene glycol 3350 17 Gram(s) Oral daily  senna 2 Tablet(s) Oral at bedtime  ticagrelor 90 milliGRAM(s) Oral every 12 hours      Vitals:  T(F): 97.9 (05-27), Max: 97.9 (05-27)  HR: 67 (05-27) (67 - 78)  BP: 128/70 (05-27) (117/69 - 165/81)  RR: 18 (05-27)  SpO2: 96% (05-27)  I&O's Summary    26 May 2024 07:01  -  27 May 2024 07:00  --------------------------------------------------------  IN: 1130 mL / OUT: 2225 mL / NET: -1095 mL        Physical Exam:  Appearance: Normal, NAD  HEAD:  Atraumatic, Normocephalic  EYES: EOMI, PERRLA, conjunctiva and sclera clear  NECK: Supple, No JVD  CHEST/LUNG: Clear to auscultation bilaterally; No wheezing  HEART: Normal S1, S2. No murmurs, rubs, or gallops  ABDOMEN: + Bowel sounds, Soft, NT, ND   EXTREMITIES:  2+ Peripheral Pulses, No clubbing, cyanosis, or edema  NEUROLOGY: non-focal, aaox3  Lymphatic: No lymphadenopathy  SKIN: No rashes or lesions                          13.2   7.93  )-----------( 189      ( 27 May 2024 06:58 )             38.2     05-26    134<L>  |  99  |  28<H>  ----------------------------<  194<H>  3.8   |  22  |  1.80<H>    Ca    9.4      26 May 2024 04:37  Phos  4.9     05-26  Mg     2.0     05-26    TPro  7.3  /  Alb  3.5  /  TBili  1.4<H>  /  DBili  x   /  AST  48<H>  /  ALT  23  /  AlkPhos  56  05-26    PT/INR - ( 26 May 2024 04:37 )   PT: 11.8 sec;   INR: 1.07 ratio         PTT - ( 26 May 2024 04:37 )  PTT:33.7 sec  CARDIAC MARKERS ( 24 May 2024 12:31 )  1978 ng/L / x     / x     / 619 U/L / x     / 41.2 ng/mL    TTE  CONCLUSIONS:      1. Left ventricular systolic function is normal with an ejection fraction of 59 % by Quintero's method of disks. There are no regional wall motion abnormalities seen.   2. Normal right ventricular cavity size, with normal wall thickness, and probably normal systolic function.   3. Mild mitral regurgitation.   4. Limited study      No regional wall motion abnornality or flail mitral valve.   5. No prior echocardiogram is available for comparison.   6. There is normal LV mass and concentric remodeling.     Vernon Blevins MD  Cardiology Fellow  [phone]  All Cardiology service information can be found 24/7 on [email or web address], password: [email or web address]    Patient seen and examined at bedside.  Accompanied by wife at bedside. No overnight events. No orthopnea.  AF HR 60s-70s BP 110s-160s/60s-80s satting 96-98% Ra. Currently 120s/70s. TEle: NSR   Net neg 1.095L (1130/2229)  152.7 > 147.4lb (standing weights)  7.93 < 13.2 > 189  Cre downtrend from 2.24 > 1.80  Discussed with interventional cardiology about timing of cath, given initial stemi criteria and now improved creatinine, resolved orthopnea. Plan for Tuesday with Dr. Arboleda on account of lesion complexity (non-flow limiting 90% of LIMA to LAD)    Review Of Systems: No chest pain, shortness of breath, or palpitations            Current Meds:  acetaminophen     Tablet .. 650 milliGRAM(s) Oral every 6 hours PRN  artificial  tears Solution 1 Drop(s) Both EYES three times a day  aspirin  chewable 81 milliGRAM(s) Oral daily  atorvastatin 80 milliGRAM(s) Oral at bedtime  carvedilol 6.25 milliGRAM(s) Oral every 12 hours  chlorhexidine 2% Cloths 1 Application(s) Topical daily  dorzolamide 2%/timolol 0.5% Ophthalmic Solution 1 Drop(s) Both EYES two times a day  furosemide    Tablet 40 milliGRAM(s) Oral daily  gabapentin 300 milliGRAM(s) Oral daily  heparin   Injectable 5000 Unit(s) SubCutaneous every 8 hours  insulin glargine Injectable (LANTUS) 35 Unit(s) SubCutaneous at bedtime  insulin lispro (ADMELOG) corrective regimen sliding scale   SubCutaneous at bedtime  insulin lispro (ADMELOG) corrective regimen sliding scale   SubCutaneous three times a day before meals  insulin lispro Injectable (ADMELOG) 10 Unit(s) SubCutaneous three times a day before meals  latanoprost 0.005% Ophthalmic Solution 1 Drop(s) Both EYES at bedtime  levothyroxine 100 MICROGram(s) Oral daily  melatonin 5 milliGRAM(s) Oral at bedtime  Netarsudil (Rhopressa) 0.02% opth soln 1 Drop(s) 1 Drop(s) Both EYES at bedtime  polyethylene glycol 3350 17 Gram(s) Oral daily  senna 2 Tablet(s) Oral at bedtime  ticagrelor 90 milliGRAM(s) Oral every 12 hours      Vitals:  T(F): 97.9 (05-27), Max: 97.9 (05-27)  HR: 67 (05-27) (67 - 78)  BP: 128/70 (05-27) (117/69 - 165/81)  RR: 18 (05-27)  SpO2: 96% (05-27)  I&O's Summary    26 May 2024 07:01  -  27 May 2024 07:00  --------------------------------------------------------  IN: 1130 mL / OUT: 2225 mL / NET: -1095 mL        Physical Exam:  Appearance: Normal, NAD  HEAD:  Atraumatic, Normocephalic  EYES: EOMI, PERRLA, conjunctiva and sclera clear  NECK: Supple, No JVD  CHEST/LUNG: Clear to auscultation bilaterally; No wheezing  HEART: Normal S1, S2. No murmurs, rubs, or gallops  ABDOMEN: + Bowel sounds, Soft, NT, ND   EXTREMITIES:  2+ Peripheral Pulses, No clubbing, cyanosis, or edema  NEUROLOGY: non-focal, aaox3  Lymphatic: No lymphadenopathy  SKIN: No rashes or lesions                          13.2   7.93  )-----------( 189      ( 27 May 2024 06:58 )             38.2     05-26    134<L>  |  99  |  28<H>  ----------------------------<  194<H>  3.8   |  22  |  1.80<H>    Ca    9.4      26 May 2024 04:37  Phos  4.9     05-26  Mg     2.0     05-26    TPro  7.3  /  Alb  3.5  /  TBili  1.4<H>  /  DBili  x   /  AST  48<H>  /  ALT  23  /  AlkPhos  56  05-26    PT/INR - ( 26 May 2024 04:37 )   PT: 11.8 sec;   INR: 1.07 ratio         PTT - ( 26 May 2024 04:37 )  PTT:33.7 sec  CARDIAC MARKERS ( 24 May 2024 12:31 )  1978 ng/L / x     / x     / 619 U/L / x     / 41.2 ng/mL    TTE  CONCLUSIONS:      1. Left ventricular systolic function is normal with an ejection fraction of 59 % by Quintero's method of disks. There are no regional wall motion abnormalities seen.   2. Normal right ventricular cavity size, with normal wall thickness, and probably normal systolic function.   3. Mild mitral regurgitation.   4. Limited study      No regional wall motion abnornality or flail mitral valve.   5. No prior echocardiogram is available for comparison.   6. There is normal LV mass and concentric remodeling.     Vernon Blevins MD  Cardiology Fellow  [phone]  All Cardiology service information can be found 24/7 on [email or web address], password: [email or web address]    Patient seen and examined at bedside.  Accompanied by wife at bedside. No overnight events. No orthopnea. On and off chest pain, not present during my interview  AF HR 60s-70s BP 110s-160s/60s-80s satting 96-98% Ra. Currently 120s/70s. TEle: NSR   Net neg 1.095L (1130/2225)  152.7 > 147.4lb (standing weights)  7.93 < 13.2 > 189  Cre downtrend from 2.24 > 1.80  Discussed with interventional cardiology about timing of cath, given initial stemi criteria and now improved creatinine, resolved orthopnea. Plan for Tuesday with Dr. Arboleda on account of lesion complexity (non-flow limiting 90% of LIMA to LAD)    Review Of Systems: No chest pain, shortness of breath, or palpitations            Current Meds:  acetaminophen     Tablet .. 650 milliGRAM(s) Oral every 6 hours PRN  artificial  tears Solution 1 Drop(s) Both EYES three times a day  aspirin  chewable 81 milliGRAM(s) Oral daily  atorvastatin 80 milliGRAM(s) Oral at bedtime  carvedilol 6.25 milliGRAM(s) Oral every 12 hours  chlorhexidine 2% Cloths 1 Application(s) Topical daily  dorzolamide 2%/timolol 0.5% Ophthalmic Solution 1 Drop(s) Both EYES two times a day  furosemide    Tablet 40 milliGRAM(s) Oral daily  gabapentin 300 milliGRAM(s) Oral daily  heparin   Injectable 5000 Unit(s) SubCutaneous every 8 hours  insulin glargine Injectable (LANTUS) 35 Unit(s) SubCutaneous at bedtime  insulin lispro (ADMELOG) corrective regimen sliding scale   SubCutaneous at bedtime  insulin lispro (ADMELOG) corrective regimen sliding scale   SubCutaneous three times a day before meals  insulin lispro Injectable (ADMELOG) 10 Unit(s) SubCutaneous three times a day before meals  latanoprost 0.005% Ophthalmic Solution 1 Drop(s) Both EYES at bedtime  levothyroxine 100 MICROGram(s) Oral daily  melatonin 5 milliGRAM(s) Oral at bedtime  Netarsudil (Rhopressa) 0.02% opth soln 1 Drop(s) 1 Drop(s) Both EYES at bedtime  polyethylene glycol 3350 17 Gram(s) Oral daily  senna 2 Tablet(s) Oral at bedtime  ticagrelor 90 milliGRAM(s) Oral every 12 hours      Vitals:  T(F): 97.9 (05-27), Max: 97.9 (05-27)  HR: 67 (05-27) (67 - 78)  BP: 128/70 (05-27) (117/69 - 165/81)  RR: 18 (05-27)  SpO2: 96% (05-27)  I&O's Summary    26 May 2024 07:01  -  27 May 2024 07:00  --------------------------------------------------------  IN: 1130 mL / OUT: 2225 mL / NET: -1095 mL        Physical Exam:  Appearance: Normal, NAD  HEAD:  Atraumatic, Normocephalic  EYES: EOMI, PERRLA, conjunctiva and sclera clear  NECK: Supple, No JVD  CHEST/LUNG: Clear to auscultation bilaterally; No wheezing  HEART: Normal S1, S2. No murmurs, rubs, or gallops  ABDOMEN: + Bowel sounds, Soft, NT, ND   EXTREMITIES:  2+ Peripheral Pulses, No clubbing, cyanosis, or edema  NEUROLOGY: non-focal, aaox3  Lymphatic: No lymphadenopathy  SKIN: No rashes or lesions                          13.2   7.93  )-----------( 189      ( 27 May 2024 06:58 )             38.2     05-26    134<L>  |  99  |  28<H>  ----------------------------<  194<H>  3.8   |  22  |  1.80<H>    Ca    9.4      26 May 2024 04:37  Phos  4.9     05-26  Mg     2.0     05-26    TPro  7.3  /  Alb  3.5  /  TBili  1.4<H>  /  DBili  x   /  AST  48<H>  /  ALT  23  /  AlkPhos  56  05-26    PT/INR - ( 26 May 2024 04:37 )   PT: 11.8 sec;   INR: 1.07 ratio         PTT - ( 26 May 2024 04:37 )  PTT:33.7 sec  CARDIAC MARKERS ( 24 May 2024 12:31 )  1978 ng/L / x     / x     / 619 U/L / x     / 41.2 ng/mL    TTE  CONCLUSIONS:      1. Left ventricular systolic function is normal with an ejection fraction of 59 % by Quintero's method of disks. There are no regional wall motion abnormalities seen.   2. Normal right ventricular cavity size, with normal wall thickness, and probably normal systolic function.   3. Mild mitral regurgitation.   4. Limited study      No regional wall motion abnornality or flail mitral valve.   5. No prior echocardiogram is available for comparison.   6. There is normal LV mass and concentric remodeling.

## 2024-05-27 NOTE — PROGRESS NOTE ADULT - ATTENDING COMMENTS
-Reports some chest wall discomfort. -Trops downtrended. C/w tele. -F/u cards recs. -P/w STEMI; diagnostic cath noted non-flow limiting 90% of LIMA to LAD. -Cr improving. Pending angiogram, now able to lay flat. -D/w cards, will start heparin gtt pending intervention on lesion. -C/w DAPT and atorvastatin. Consider uptitrating BB. Hold ACEi/ARB for now given elevated Cr.   -D/w patient and wife at bedside. -D/w housestaff. -Reports some chest wall discomfort. -Trops downtrended. C/w tele. -F/u cards recs. -P/w STEMI; diagnostic cath noted non-flow limiting 90% of LIMA to LAD. -Cr improving. Pending angiogram, now able to lay flat. -D/w cards, will start heparin gtt pending intervention on lesion. -C/w DAPT and atorvastatin. Consider uptitrating BB. Hold ACEi/ARB for now given elevated Cr. -For now, would hold off on any aggressive PT/ambulation until intervention.   -D/w patient and wife at bedside. -D/w housestaff.

## 2024-05-28 ENCOUNTER — TRANSCRIPTION ENCOUNTER (OUTPATIENT)
Age: 64
End: 2024-05-28

## 2024-05-28 LAB
ALBUMIN SERPL ELPH-MCNC: 3.6 G/DL — SIGNIFICANT CHANGE UP (ref 3.3–5)
ALP SERPL-CCNC: 64 U/L — SIGNIFICANT CHANGE UP (ref 40–120)
ALT FLD-CCNC: 24 U/L — SIGNIFICANT CHANGE UP (ref 10–45)
ANION GAP SERPL CALC-SCNC: 11 MMOL/L — SIGNIFICANT CHANGE UP (ref 5–17)
ANION GAP SERPL CALC-SCNC: 13 MMOL/L — SIGNIFICANT CHANGE UP (ref 5–17)
APTT BLD: 37.8 SEC — HIGH (ref 24.5–35.6)
APTT BLD: 96.2 SEC — HIGH (ref 24.5–35.6)
AST SERPL-CCNC: 29 U/L — SIGNIFICANT CHANGE UP (ref 10–40)
BASOPHILS # BLD AUTO: 0.04 K/UL — SIGNIFICANT CHANGE UP (ref 0–0.2)
BASOPHILS NFR BLD AUTO: 0.5 % — SIGNIFICANT CHANGE UP (ref 0–2)
BILIRUB SERPL-MCNC: 0.8 MG/DL — SIGNIFICANT CHANGE UP (ref 0.2–1.2)
BUN SERPL-MCNC: 25 MG/DL — HIGH (ref 7–23)
BUN SERPL-MCNC: 26 MG/DL — HIGH (ref 7–23)
CALCIUM SERPL-MCNC: 9.1 MG/DL — SIGNIFICANT CHANGE UP (ref 8.4–10.5)
CALCIUM SERPL-MCNC: 9.6 MG/DL — SIGNIFICANT CHANGE UP (ref 8.4–10.5)
CHLORIDE SERPL-SCNC: 101 MMOL/L — SIGNIFICANT CHANGE UP (ref 96–108)
CHLORIDE SERPL-SCNC: 98 MMOL/L — SIGNIFICANT CHANGE UP (ref 96–108)
CO2 SERPL-SCNC: 22 MMOL/L — SIGNIFICANT CHANGE UP (ref 22–31)
CO2 SERPL-SCNC: 25 MMOL/L — SIGNIFICANT CHANGE UP (ref 22–31)
CREAT SERPL-MCNC: 0.57 MG/DL — SIGNIFICANT CHANGE UP (ref 0.5–1.3)
CREAT SERPL-MCNC: 1.66 MG/DL — HIGH (ref 0.5–1.3)
EGFR: 108 ML/MIN/1.73M2 — SIGNIFICANT CHANGE UP
EGFR: 45 ML/MIN/1.73M2 — LOW
EOSINOPHIL # BLD AUTO: 0.45 K/UL — SIGNIFICANT CHANGE UP (ref 0–0.5)
EOSINOPHIL NFR BLD AUTO: 5.5 % — SIGNIFICANT CHANGE UP (ref 0–6)
GLUCOSE BLDC GLUCOMTR-MCNC: 108 MG/DL — HIGH (ref 70–99)
GLUCOSE BLDC GLUCOMTR-MCNC: 203 MG/DL — HIGH (ref 70–99)
GLUCOSE BLDC GLUCOMTR-MCNC: 223 MG/DL — HIGH (ref 70–99)
GLUCOSE BLDC GLUCOMTR-MCNC: 230 MG/DL — HIGH (ref 70–99)
GLUCOSE SERPL-MCNC: 187 MG/DL — HIGH (ref 70–99)
GLUCOSE SERPL-MCNC: 224 MG/DL — HIGH (ref 70–99)
HCT VFR BLD CALC: 41.4 % — SIGNIFICANT CHANGE UP (ref 39–50)
HCT VFR BLD CALC: 42.5 % — SIGNIFICANT CHANGE UP (ref 39–50)
HGB BLD-MCNC: 14.3 G/DL — SIGNIFICANT CHANGE UP (ref 13–17)
HGB BLD-MCNC: 14.3 G/DL — SIGNIFICANT CHANGE UP (ref 13–17)
IMM GRANULOCYTES NFR BLD AUTO: 0.7 % — SIGNIFICANT CHANGE UP (ref 0–0.9)
INR BLD: 1.08 RATIO — SIGNIFICANT CHANGE UP (ref 0.85–1.18)
LYMPHOCYTES # BLD AUTO: 1.82 K/UL — SIGNIFICANT CHANGE UP (ref 1–3.3)
LYMPHOCYTES # BLD AUTO: 22.4 % — SIGNIFICANT CHANGE UP (ref 13–44)
MAGNESIUM SERPL-MCNC: 2.1 MG/DL — SIGNIFICANT CHANGE UP (ref 1.6–2.6)
MCHC RBC-ENTMCNC: 28.5 PG — SIGNIFICANT CHANGE UP (ref 27–34)
MCHC RBC-ENTMCNC: 28.5 PG — SIGNIFICANT CHANGE UP (ref 27–34)
MCHC RBC-ENTMCNC: 33.6 GM/DL — SIGNIFICANT CHANGE UP (ref 32–36)
MCHC RBC-ENTMCNC: 34.5 GM/DL — SIGNIFICANT CHANGE UP (ref 32–36)
MCV RBC AUTO: 82.5 FL — SIGNIFICANT CHANGE UP (ref 80–100)
MCV RBC AUTO: 84.7 FL — SIGNIFICANT CHANGE UP (ref 80–100)
MONOCYTES # BLD AUTO: 1.14 K/UL — HIGH (ref 0–0.9)
MONOCYTES NFR BLD AUTO: 14 % — SIGNIFICANT CHANGE UP (ref 2–14)
NEUTROPHILS # BLD AUTO: 4.63 K/UL — SIGNIFICANT CHANGE UP (ref 1.8–7.4)
NEUTROPHILS NFR BLD AUTO: 56.9 % — SIGNIFICANT CHANGE UP (ref 43–77)
NRBC # BLD: 0 /100 WBCS — SIGNIFICANT CHANGE UP (ref 0–0)
NRBC # BLD: 0 /100 WBCS — SIGNIFICANT CHANGE UP (ref 0–0)
PHOSPHATE SERPL-MCNC: 3.3 MG/DL — SIGNIFICANT CHANGE UP (ref 2.5–4.5)
PLATELET # BLD AUTO: 222 K/UL — SIGNIFICANT CHANGE UP (ref 150–400)
PLATELET # BLD AUTO: 226 K/UL — SIGNIFICANT CHANGE UP (ref 150–400)
POTASSIUM SERPL-MCNC: 3.7 MMOL/L — SIGNIFICANT CHANGE UP (ref 3.5–5.3)
POTASSIUM SERPL-MCNC: 3.8 MMOL/L — SIGNIFICANT CHANGE UP (ref 3.5–5.3)
POTASSIUM SERPL-SCNC: 3.7 MMOL/L — SIGNIFICANT CHANGE UP (ref 3.5–5.3)
POTASSIUM SERPL-SCNC: 3.8 MMOL/L — SIGNIFICANT CHANGE UP (ref 3.5–5.3)
PROT SERPL-MCNC: 7.5 G/DL — SIGNIFICANT CHANGE UP (ref 6–8.3)
PROTHROM AB SERPL-ACNC: 11.3 SEC — SIGNIFICANT CHANGE UP (ref 9.5–13)
RBC # BLD: 5.02 M/UL — SIGNIFICANT CHANGE UP (ref 4.2–5.8)
RBC # BLD: 5.02 M/UL — SIGNIFICANT CHANGE UP (ref 4.2–5.8)
RBC # FLD: 13 % — SIGNIFICANT CHANGE UP (ref 10.3–14.5)
RBC # FLD: 13.2 % — SIGNIFICANT CHANGE UP (ref 10.3–14.5)
SODIUM SERPL-SCNC: 134 MMOL/L — LOW (ref 135–145)
SODIUM SERPL-SCNC: 136 MMOL/L — SIGNIFICANT CHANGE UP (ref 135–145)
WBC # BLD: 7.55 K/UL — SIGNIFICANT CHANGE UP (ref 3.8–10.5)
WBC # BLD: 8.14 K/UL — SIGNIFICANT CHANGE UP (ref 3.8–10.5)
WBC # FLD AUTO: 7.55 K/UL — SIGNIFICANT CHANGE UP (ref 3.8–10.5)
WBC # FLD AUTO: 8.14 K/UL — SIGNIFICANT CHANGE UP (ref 3.8–10.5)

## 2024-05-28 PROCEDURE — 99232 SBSQ HOSP IP/OBS MODERATE 35: CPT

## 2024-05-28 PROCEDURE — 92937 PRQ TRLUML REVSC CAB GRF 1: CPT | Mod: LC

## 2024-05-28 PROCEDURE — 99152 MOD SED SAME PHYS/QHP 5/>YRS: CPT

## 2024-05-28 PROCEDURE — 99232 SBSQ HOSP IP/OBS MODERATE 35: CPT | Mod: GC

## 2024-05-28 PROCEDURE — 93010 ELECTROCARDIOGRAM REPORT: CPT

## 2024-05-28 RX ORDER — INSULIN GLARGINE 100 [IU]/ML
42 INJECTION, SOLUTION SUBCUTANEOUS AT BEDTIME
Refills: 0 | Status: DISCONTINUED | OUTPATIENT
Start: 2024-05-28 | End: 2024-05-29

## 2024-05-28 RX ORDER — TICAGRELOR 90 MG/1
1 TABLET ORAL
Qty: 60 | Refills: 0
Start: 2024-05-28 | End: 2024-06-26

## 2024-05-28 RX ORDER — INSULIN LISPRO 100/ML
12 VIAL (ML) SUBCUTANEOUS
Refills: 0 | Status: DISCONTINUED | OUTPATIENT
Start: 2024-05-29 | End: 2024-05-29

## 2024-05-28 RX ORDER — SODIUM CHLORIDE 9 MG/ML
1000 INJECTION INTRAMUSCULAR; INTRAVENOUS; SUBCUTANEOUS
Refills: 0 | Status: DISCONTINUED | OUTPATIENT
Start: 2024-05-28 | End: 2024-05-29

## 2024-05-28 RX ORDER — POTASSIUM CHLORIDE 20 MEQ
20 PACKET (EA) ORAL ONCE
Refills: 0 | Status: COMPLETED | OUTPATIENT
Start: 2024-05-28 | End: 2024-05-28

## 2024-05-28 RX ORDER — HYDRALAZINE HCL 50 MG
10 TABLET ORAL ONCE
Refills: 0 | Status: COMPLETED | OUTPATIENT
Start: 2024-05-28 | End: 2024-05-28

## 2024-05-28 RX ORDER — ACETAMINOPHEN 500 MG
650 TABLET ORAL ONCE
Refills: 0 | Status: COMPLETED | OUTPATIENT
Start: 2024-05-28 | End: 2024-05-28

## 2024-05-28 RX ADMIN — Medication 81 MILLIGRAM(S): at 10:31

## 2024-05-28 RX ADMIN — Medication 100 MICROGRAM(S): at 05:38

## 2024-05-28 RX ADMIN — Medication 10 UNIT(S): at 18:05

## 2024-05-28 RX ADMIN — HEPARIN SODIUM 0 UNIT(S)/HR: 5000 INJECTION INTRAVENOUS; SUBCUTANEOUS at 02:28

## 2024-05-28 RX ADMIN — DORZOLAMIDE HYDROCHLORIDE TIMOLOL MALEATE 1 DROP(S): 20; 5 SOLUTION/ DROPS OPHTHALMIC at 05:39

## 2024-05-28 RX ADMIN — Medication 13 UNIT(S): at 08:09

## 2024-05-28 RX ADMIN — HEPARIN SODIUM 500 UNIT(S)/HR: 5000 INJECTION INTRAVENOUS; SUBCUTANEOUS at 03:30

## 2024-05-28 RX ADMIN — Medication 4: at 08:09

## 2024-05-28 RX ADMIN — CARVEDILOL PHOSPHATE 6.25 MILLIGRAM(S): 80 CAPSULE, EXTENDED RELEASE ORAL at 18:07

## 2024-05-28 RX ADMIN — Medication 20 MILLIEQUIVALENT(S): at 08:11

## 2024-05-28 RX ADMIN — Medication 1 DROP(S): at 21:28

## 2024-05-28 RX ADMIN — Medication 40 MILLIGRAM(S): at 05:38

## 2024-05-28 RX ADMIN — Medication 5 MILLIGRAM(S): at 21:27

## 2024-05-28 RX ADMIN — Medication 650 MILLIGRAM(S): at 13:05

## 2024-05-28 RX ADMIN — Medication 4: at 18:05

## 2024-05-28 RX ADMIN — Medication 650 MILLIGRAM(S): at 12:05

## 2024-05-28 RX ADMIN — Medication 650 MILLIGRAM(S): at 18:04

## 2024-05-28 RX ADMIN — TICAGRELOR 90 MILLIGRAM(S): 90 TABLET ORAL at 18:06

## 2024-05-28 RX ADMIN — Medication 10 UNIT(S): at 12:05

## 2024-05-28 RX ADMIN — ATORVASTATIN CALCIUM 80 MILLIGRAM(S): 80 TABLET, FILM COATED ORAL at 21:27

## 2024-05-28 RX ADMIN — INSULIN GLARGINE 42 UNIT(S): 100 INJECTION, SOLUTION SUBCUTANEOUS at 21:28

## 2024-05-28 RX ADMIN — DORZOLAMIDE HYDROCHLORIDE TIMOLOL MALEATE 1 DROP(S): 20; 5 SOLUTION/ DROPS OPHTHALMIC at 18:06

## 2024-05-28 RX ADMIN — TICAGRELOR 90 MILLIGRAM(S): 90 TABLET ORAL at 05:38

## 2024-05-28 RX ADMIN — Medication 10 MILLIGRAM(S): at 18:03

## 2024-05-28 RX ADMIN — LATANOPROST 1 DROP(S): 0.05 SOLUTION/ DROPS OPHTHALMIC; TOPICAL at 21:28

## 2024-05-28 RX ADMIN — Medication 1 DROP(S): at 05:39

## 2024-05-28 RX ADMIN — SENNA PLUS 2 TABLET(S): 8.6 TABLET ORAL at 21:28

## 2024-05-28 NOTE — PROGRESS NOTE ADULT - ASSESSMENT
64y old Male with history of T2DM, CKD, HTN, CABG, Hypothyroidism presenting with STEMI.    1. T2DM with hyperglycemia  - Increase Lantus to 42 units at night  - Continue Admelog 13 units with breakfast, increase to 12 units with lunch and continue 10 units with dinner  - Continue moderate Admelog correctional scale before meals and bedtime  - Monitor FS before meals and bedtime  - Follow hospital hypoglycemic protocol    2. Hypothyroidism  - TSH normal  - Continue Levothyroxine 100 mcg daily    3. STEMI  - with history of CABG, awaiting PCI until respiratory status improves  - would also benefit from GLP1 agonist/SGLT2 inhibitor outpatient    4. CKD  - eGFR stable in the 40s  - monitor for hypoglycemia    Will continue to follow.    Reza Vanegas MD  [facility]- Division of Endocrinology    [address] [place] [address]    T [phone]  F [phone]

## 2024-05-28 NOTE — PROVIDER CONTACT NOTE (OTHER) - ACTION/TREATMENT ORDERED:
NP aware.
EKG done/ Hydralazine IVP to be administered as ordered. Purposeful rounding ongoing.
Provider made aware, To assess patient at bedside. ice packs admin; leg elevated
Provider aware, tele monitoring remains ongoing and plan of care continues

## 2024-05-28 NOTE — PROGRESS NOTE ADULT - ATTENDING COMMENTS
Presented with chest pain. Cath 5/24 showed CHACORTA III flow, but concerning LIMA/LAD lesion. Plan for PCI today.

## 2024-05-28 NOTE — DISCHARGE NOTE PROVIDER - CARE PROVIDER_API CALL
Janeth Arboleda  Interventional Cardiology  [address] [place] [address], [address] [place] [id number]  Phone: [phone]  Fax: [phone]  Follow Up Time: 1 week   Janeth Arboleda  Interventional Cardiology  [address] [facility], [address] [place] [id number]  Phone: [phone]  Fax: [phone]  Follow Up Time: 1 week    Reza Vanegas  Endocrinology/Metab/Diabetes  [address] [facility], [address]  [place] [address]  Phone: [phone]  Fax: [phone]  Follow Up Time: 1 week

## 2024-05-28 NOTE — PROVIDER CONTACT NOTE (OTHER) - SITUATION
Patient s/p cath via right radial 5/28. B/P 217/96, HR 65 Patient s/p cath via right radial 5/28. B/P 217/96, HR 65  Pause 2:1 sec

## 2024-05-28 NOTE — PROGRESS NOTE ADULT - ASSESSMENT
New ECG(s): Personally reviewed    Echo:  5/24/24   1. Left ventricular systolic function is normal with an ejection fraction of 59 % by Quintero's method of disks. There are no regional wall motion abnormalities seen.   2. Normal right ventricular cavity size, with normal wall thickness, and probably normal systolic function.   3. Mild mitral regurgitation.   4. Limited study-     No regional wall motion abnornality or flail mitral valve.   5. No prior echocardiogram is available for comparison.   6. There is normal LV mass and concentric remodeling.    Cath:  5/24/24 [facility] showed diffuse disease, non-flow limiting 90% LIMA-LAD    Interpretation of Telemetry: sinus rhythm 50s-60s, sinus bradycardia with brief junctional rhythm at 3am    64M PMHx CABG (2018), PCI x3 prior to 2018, CKD3, HTN, DM, HLD, presenting with chest pain and shortness of breathing. EKG showed STEMI pathology, patient transferred to cath lab for C, unable to tolerate lying flat. Did have nonflow-limiting lesion of LIMA-LAD on diagnostic cath, but no intervention on account of TRANG. Patient transferred to CICU for further monitoring, now downgraded to floors.     Recommendations:   - Continue aspirin, brillinta  - Continue atorva 80  - Continue carvedilol 6.25mg q12h  - Holding on ACEi/ARB 2/2 TRANG  - [facility] today      Sakina Walters MD  PGY-4, Cardiology  Available on TEAMS    For all new consults  [email or web address]  Login: [email or web address]

## 2024-05-28 NOTE — DISCHARGE NOTE PROVIDER - CARE PROVIDERS DIRECT ADDRESSES
,[email or web address],DirectAddress_Unknown

## 2024-05-28 NOTE — PROVIDER CONTACT NOTE (OTHER) - ASSESSMENT
Patient is A&Ox2-3, denies any chest pain, SOB, lightheadedness or dizziness. VSS otherwise.
B/P 217/96, HR 65  Repeat B/P @ 1852 142/68, 67  Patient asymptomatic
A&Ox2-3. VSS. Pt c/o mild discomfort when pressed on right groin site. Nodule noted.
A&Ox2-3. VSS. Pt c/o mild discomfort when pressed on right groin site. complains of pain upon palpation. Site marked from 5/25. Bigger in comparison today 5/26. Site has scattered ecchymosis. Nodule noted to be bigger.

## 2024-05-28 NOTE — DISCHARGE NOTE PROVIDER - NSDCCPCAREPLAN_GEN_ALL_CORE_FT
PRINCIPAL DISCHARGE DIAGNOSIS  Diagnosis: STEMI (ST elevation myocardial infarction)  Assessment and Plan of Treatment: You presented to the hospital with chest pain and shortness of breath. You were found to have a STEMI or ST elevation myocardial infarction. You were evaluated by our cardiology team and underwent a Left heart cath with showed 90% blockage in one of your coronary arteries. This blockage was treated with a stent. Please follow up with Dr. Arboleda for further managment of your coronary artery disease. Please continue taking Aspirin daily as prescribed. You are being started on a new medication called Brillinta, please take this medication as prescribed.      SECONDARY DISCHARGE DIAGNOSES  Diagnosis: Diabetes mellitus  Assessment and Plan of Treatment: You were followed by endocrinology for your diabetes. Your insulin regimen was optimized in the hospital. You are being sent home on __     PRINCIPAL DISCHARGE DIAGNOSIS  Diagnosis: STEMI (ST elevation myocardial infarction)  Assessment and Plan of Treatment: You presented to the hospital with chest pain and shortness of breath. You were found to have a STEMI or ST elevation myocardial infarction. You were evaluated by our cardiology team and underwent a Left heart cath with showed 90% blockage in one of your coronary arteries. This blockage was treated with a stent. Please follow up with Dr. Arboleda for further managment of your coronary artery disease. Please continue taking Aspirin daily as prescribed. You are being started on a new medication called Brillinta, please take this medication as prescribed. You are also being started on a medication called Aldactone, please take this medication as prescribed.

## 2024-05-28 NOTE — PROGRESS NOTE ADULT - SUBJECTIVE AND OBJECTIVE BOX
Overnight Events: NAEO    Review Of Systems: No chest pain, shortness of breath, or palpitations            Current Meds:  acetaminophen     Tablet .. 650 milliGRAM(s) Oral every 6 hours PRN  artificial  tears Solution 1 Drop(s) Both EYES three times a day  aspirin  chewable 81 milliGRAM(s) Oral daily  atorvastatin 80 milliGRAM(s) Oral at bedtime  carvedilol 6.25 milliGRAM(s) Oral every 12 hours  chlorhexidine 2% Cloths 1 Application(s) Topical daily  dorzolamide 2%/timolol 0.5% Ophthalmic Solution 1 Drop(s) Both EYES two times a day  furosemide    Tablet 40 milliGRAM(s) Oral daily  gabapentin 300 milliGRAM(s) Oral daily  insulin glargine Injectable (LANTUS) 38 Unit(s) SubCutaneous at bedtime  insulin lispro (ADMELOG) corrective regimen sliding scale   SubCutaneous three times a day before meals  insulin lispro (ADMELOG) corrective regimen sliding scale   SubCutaneous at bedtime  insulin lispro Injectable (ADMELOG) 13 Unit(s) SubCutaneous before breakfast  insulin lispro Injectable (ADMELOG) 10 Unit(s) SubCutaneous before dinner  insulin lispro Injectable (ADMELOG) 10 Unit(s) SubCutaneous before lunch  latanoprost 0.005% Ophthalmic Solution 1 Drop(s) Both EYES at bedtime  levothyroxine 100 MICROGram(s) Oral daily  melatonin 5 milliGRAM(s) Oral at bedtime  Netarsudil (Rhopressa) 0.02% opth soln 1 Drop(s) 1 Drop(s) Both EYES at bedtime  nitroglycerin     SubLingual 0.4 milliGRAM(s) SubLingual every 5 minutes PRN  polyethylene glycol 3350 17 Gram(s) Oral daily  senna 2 Tablet(s) Oral at bedtime  sodium chloride 0.9%. 1000 milliLiter(s) IV Continuous <Continuous>  ticagrelor 90 milliGRAM(s) Oral every 12 hours      Vitals:  T(F): 97.4 (05-28), Max: 98.2 (05-27)  HR: 56 (05-28) (50 - 62)  BP: 160/75 (05-28) (131/75 - 178/82)  RR: 17 (05-28)  SpO2: 100% (05-28)  I&O's Summary    27 May 2024 07:01  -  28 May 2024 07:00  --------------------------------------------------------  IN: 660 mL / OUT: 1825 mL / NET: -1165 mL        Physical Exam:  GEN: comfortable appearing, lying in bed in NAD  HEENT: NCAT, MMM  CV: Regular S1, S2, no m/r/g  RESP: CTAB  ABD: Soft, NTND, +BS  EXT: No LE edema, WWP, pulses palpable throughout  NEURO: No focal deficits, AOx3  SKIN:  No rashes                          14.3   7.55  )-----------( 226      ( 28 May 2024 09:17 )             41.4     05-28    134<L>  |  98  |  25<H>  ----------------------------<  224<H>  3.7   |  25  |  1.66<H>    Ca    9.6      28 May 2024 09:17  Phos  3.3     05-28  Mg     2.1     05-28    TPro  7.5  /  Alb  3.6  /  TBili  0.8  /  DBili  x   /  AST  29  /  ALT  24  /  AlkPhos  64  05-28    PT/INR - ( 28 May 2024 07:02 )   PT: 11.3 sec;   INR: 1.08 ratio         PTT - ( 28 May 2024 07:02 )  PTT:37.8 sec  CARDIAC MARKERS ( 27 May 2024 10:45 )  1766 ng/L / x     / x     / x     / x     / 2.6 ng/mL  CARDIAC MARKERS ( 24 May 2024 12:31 )  1978 ng/L / x     / x     / 619 U/L / x     / 41.2 ng/mL

## 2024-05-28 NOTE — PROGRESS NOTE ADULT - SUBJECTIVE AND OBJECTIVE BOX
[facility] Progress Note    MAR, chart notes, fingersticks and labs reviewed    Subjective: feels well    Objective  Vital Signs  T(C): 36.4 (05-28-24 @ 15:20), Max: 36.8 (05-27-24 @ 20:05)  HR: 66 (05-28-24 @ 16:22) (50 - 66)  BP: 148/72 (05-28-24 @ 15:20) (127/59 - 178/82)  RR: 18 (05-28-24 @ 16:22) (16 - 18)  SpO2: 99% (05-28-24 @ 16:22) (96% - 100%)    Physical Exam  Gen: NAD, alert, awake  HEENT: NC/AT, EOMI  Neck: supple  Chest: breathing comfortably  Heart: +s1 +s2    Medications  acetaminophen     Tablet .. 650 milliGRAM(s) Oral once  acetaminophen     Tablet .. 650 milliGRAM(s) Oral every 6 hours PRN  artificial  tears Solution 1 Drop(s) Both EYES three times a day  aspirin  chewable 81 milliGRAM(s) Oral daily  atorvastatin 80 milliGRAM(s) Oral at bedtime  carvedilol 6.25 milliGRAM(s) Oral every 12 hours  chlorhexidine 2% Cloths 1 Application(s) Topical daily  dorzolamide 2%/timolol 0.5% Ophthalmic Solution 1 Drop(s) Both EYES two times a day  furosemide    Tablet 40 milliGRAM(s) Oral daily  gabapentin 300 milliGRAM(s) Oral daily  hydrALAZINE Injectable 10 milliGRAM(s) IV Push once  insulin glargine Injectable (LANTUS) 42 Unit(s) SubCutaneous at bedtime  insulin lispro (ADMELOG) corrective regimen sliding scale   SubCutaneous at bedtime  insulin lispro (ADMELOG) corrective regimen sliding scale   SubCutaneous three times a day before meals  insulin lispro Injectable (ADMELOG) 10 Unit(s) SubCutaneous before dinner  insulin lispro Injectable (ADMELOG) 13 Unit(s) SubCutaneous before breakfast  latanoprost 0.005% Ophthalmic Solution 1 Drop(s) Both EYES at bedtime  levothyroxine 100 MICROGram(s) Oral daily  melatonin 5 milliGRAM(s) Oral at bedtime  Netarsudil (Rhopressa) 0.02% opth soln 1 Drop(s) 1 Drop(s) Both EYES at bedtime  nitroglycerin     SubLingual 0.4 milliGRAM(s) SubLingual every 5 minutes PRN  polyethylene glycol 3350 17 Gram(s) Oral daily  senna 2 Tablet(s) Oral at bedtime  sodium chloride 0.9%. 1000 milliLiter(s) IV Continuous <Continuous>  ticagrelor 90 milliGRAM(s) Oral every 12 hours    Pertinent labs/Imaging  POCT Blood Glucose.: 230 mg/dL (05-28-24 @ 17:21)  POCT Blood Glucose.: 108 mg/dL (05-28-24 @ 11:49)  POCT Blood Glucose.: 203 mg/dL (05-28-24 @ 07:29)  POCT Blood Glucose.: 158 mg/dL (05-27-24 @ 21:11)    eGFR: 45 mL/min/1.73m2 (05-28-24 @ 09:17)  eGFR: 108 mL/min/1.73m2 (05-28-24 @ 07:02)  eGFR: 44 mL/min/1.73m2 (05-27-24 @ 07:24)

## 2024-05-28 NOTE — DISCHARGE NOTE PROVIDER - NSDCFUADDAPPT_GEN_ALL_CORE_FT
Cardiac Rehab (STEMI/NSTEMI/ACS/Unstable Angina/CHF/Chronic Stable Angina/Heart Surgery (CABG,Valve)/Post PCI):              *Education on benefits of Cardiac Rehab provided to patient: Yes         *Referral and Prescription Given for Cardiac Rehab : Yes         *Pt given list of locations & instructed to contact their insurance company to review list of participating providers: Yes         *Pt instructed to bring Cardiac Rehab prescription with them to Cardiology Follow up appointment for assistance with enrollment: Yes         *Pt discharged with copies detail cardiovascular history, medications, testing/treatments: Yes      ****** Reasons for NO Cardiac rehab referral rx:              Patient Refused            Medical Reason: ex has Home Care, Home PT, incomplete revascularization            Patient lacks medical coverage for Cardiac Rehab            Pt discharged to Nursing Care/[facility]/Long term Care Facility            Patient Lacks Transportation or no cardiac rehab within 60 minutes driving range            Patient already participates in Cardiac Rehab            Other: (provide details) ex: Hospice patient

## 2024-05-28 NOTE — PROGRESS NOTE ADULT - SUBJECTIVE AND OBJECTIVE BOX
DATE OF SERVICE: 05-28-24 @ 08:55    Patient is a 64y old  Male who presents with a chief complaint of STEMI (27 May 2024 08:47)      SUBJECTIVE / OVERNIGHT EVENTS: Tele overnight sinus carroll to 37, junctional escape beats. Plan for Cath today.     MEDICATIONS  (STANDING):  artificial  tears Solution 1 Drop(s) Both EYES three times a day  aspirin  chewable 81 milliGRAM(s) Oral daily  atorvastatin 80 milliGRAM(s) Oral at bedtime  carvedilol 6.25 milliGRAM(s) Oral every 12 hours  chlorhexidine 2% Cloths 1 Application(s) Topical daily  dorzolamide 2%/timolol 0.5% Ophthalmic Solution 1 Drop(s) Both EYES two times a day  furosemide    Tablet 40 milliGRAM(s) Oral daily  gabapentin 300 milliGRAM(s) Oral daily  insulin glargine Injectable (LANTUS) 38 Unit(s) SubCutaneous at bedtime  insulin lispro (ADMELOG) corrective regimen sliding scale   SubCutaneous at bedtime  insulin lispro (ADMELOG) corrective regimen sliding scale   SubCutaneous three times a day before meals  insulin lispro Injectable (ADMELOG) 10 Unit(s) SubCutaneous before lunch  insulin lispro Injectable (ADMELOG) 10 Unit(s) SubCutaneous before dinner  insulin lispro Injectable (ADMELOG) 13 Unit(s) SubCutaneous before breakfast  latanoprost 0.005% Ophthalmic Solution 1 Drop(s) Both EYES at bedtime  levothyroxine 100 MICROGram(s) Oral daily  melatonin 5 milliGRAM(s) Oral at bedtime  Netarsudil (Rhopressa) 0.02% opth soln 1 Drop(s) 1 Drop(s) Both EYES at bedtime  polyethylene glycol 3350 17 Gram(s) Oral daily  senna 2 Tablet(s) Oral at bedtime  ticagrelor 90 milliGRAM(s) Oral every 12 hours    MEDICATIONS  (PRN):  acetaminophen     Tablet .. 650 milliGRAM(s) Oral every 6 hours PRN Temp greater or equal to 38C (100.4F), Mild Pain (1 - 3)  nitroglycerin     SubLingual 0.4 milliGRAM(s) SubLingual every 5 minutes PRN Chest Pain      Vital Signs Last 24 Hrs  T(C): 36.7 (28 May 2024 08:31), Max: 36.8 (27 May 2024 11:04)  T(F): 98.1 (28 May 2024 08:31), Max: 98.2 (27 May 2024 11:04)  HR: 58 (28 May 2024 08:31) (50 - 62)  BP: 169/74 (28 May 2024 08:31) (131/75 - 169/74)  BP(mean): --  RR: 18 (28 May 2024 08:31) (18 - 18)  SpO2: 100% (28 May 2024 08:31) (96% - 100%)    Parameters below as of 28 May 2024 08:31  Patient On (Oxygen Delivery Method): room air      CAPILLARY BLOOD GLUCOSE      POCT Blood Glucose.: 203 mg/dL (28 May 2024 07:29)  POCT Blood Glucose.: 158 mg/dL (27 May 2024 21:11)  POCT Blood Glucose.: 194 mg/dL (27 May 2024 17:02)  POCT Blood Glucose.: 325 mg/dL (27 May 2024 11:30)    I&O's Summary    27 May 2024 07:01  -  28 May 2024 07:00  --------------------------------------------------------  IN: 660 mL / OUT: 1825 mL / NET: -1165 mL        PHYSICAL EXAM:  GENERAL: NAD, well-developed  HEAD:  Atraumatic, Normocephalic  EYES: Redness to sclera   NECK: Supple, No JVD  CHEST/LUNG: Clear to auscultation bilaterally; No wheeze, chest wall with sternotomy scar   HEART: Regular rate and rhythm; No murmurs  ABDOMEN: Soft, Nontender, Nondistended  EXTREMITIES:  2+ Peripheral Pulses, No clubbing, cyanosis, or edema. LLE with scarring from CABG graft, small hematoma to right groin, right large toenail falling off  NEUROLOGY: non-focal    LABS:                        14.3   8.14  )-----------( 222      ( 28 May 2024 07:03 )             42.5     05-28    136  |  101  |  26<H>  ----------------------------<  187<H>  3.8   |  22  |  0.57    Ca    9.1      28 May 2024 07:02  Phos  3.3     05-28  Mg     2.1     05-28    TPro  7.5  /  Alb  3.6  /  TBili  0.8  /  DBili  x   /  AST  29  /  ALT  24  /  AlkPhos  64  05-28    PT/INR - ( 28 May 2024 07:02 )   PT: 11.3 sec;   INR: 1.08 ratio         PTT - ( 28 May 2024 07:02 )  PTT:37.8 sec  CARDIAC MARKERS ( 27 May 2024 10:45 )  x     / x     / x     / x     / 2.6 ng/mL      Urinalysis Basic - ( 28 May 2024 07:02 )    Color: x / Appearance: x / SG: x / pH: x  Gluc: 187 mg/dL / Ketone: x  / Bili: x / Urobili: x   Blood: x / Protein: x / Nitrite: x   Leuk Esterase: x / RBC: x / WBC x   Sq Epi: x / Non Sq Epi: x / Bacteria: x        RADIOLOGY & ADDITIONAL TESTS:    Imaging Personally Reviewed:    Consultant(s) Notes Reviewed:      Care Discussed with Consultants/Other Providers:

## 2024-05-28 NOTE — PROGRESS NOTE ADULT - ASSESSMENT
65y/o M PMHx CABG in 2018, PCI x3 prior to 2018, CKD3, HTN, DM, HLD, presenting with chest pain and shortness of breathing. EKG showed STEMI pathology, patient transferred to cath lab for LHC, but unable to tolerate lying flat. Patient transferred to CICU for further monitoring on BiPAP, now weaned to room air and transferred to medicine service pending repeat cath.    soft/nontender.../nondistended

## 2024-05-28 NOTE — DISCHARGE NOTE PROVIDER - NSDCCPTREATMENT_GEN_ALL_CORE_FT
PRINCIPAL PROCEDURE  Procedure: TTE (transthoracic echocardiography)  Findings and Treatment: CONCLUSIONS:      1. Left ventricular systolic function is normal with an ejection fraction of 59 % by Quintero's method of disks. There are no regional wall motion abnormalities seen.   2. Normal right ventricular cavity size, with normal wall thickness, and probably normal systolic function.   3. Mild mitral regurgitation.   4. Limited study      No regional wall motion abnornality or flail mitral valve.   5. No prior echocardiogram is available for comparison.   6. There is normal LV mass and concentric remodeling.

## 2024-05-28 NOTE — DISCHARGE NOTE PROVIDER - PROVIDER TOKENS
PROVIDER:[TOKEN:[[id number]],FOLLOWUP:[1 week]] PROVIDER:[TOKEN:[[id number]],FOLLOWUP:[1 week]],PROVIDER:[TOKEN:[[id number]],FOLLOWUP:[1 week]]

## 2024-05-28 NOTE — PROGRESS NOTE ADULT - ATTENDING COMMENTS
-For [facility] today. C/w tele. GDMT optimization when able. DAPT, atorvastatin. F/u cards recs. -For [facility] today. C/w tele. GDMT optimization when able. DAPT, atorvastatin. F/u cards recs.  -D/w house staff.

## 2024-05-28 NOTE — DISCHARGE NOTE PROVIDER - HOSPITAL COURSE
Discharge Summary     Admit Date: 05-24-24  Discharge Date: 05-29-24    Admission diagnoses: Atherosclerosis of native coronary artery without angina pectoris    ST elevation myocardial infarction (STEMI)    Discharge diagnoses:  STEMI s/p PCI with JEREMY    Hospital Course:   For full details, please see H&P, progress notes, consult notes and ancillary notes. Briefly, DAQUAN WEST is a 64y Male with a history of ***. The patient's hospital course will be summarized in a problem based format.    # ***    # ***    On day of discharge, patient is clinically stable with no new exam findings or acute symptoms compared to prior. The patient was seen by the attending physician on the date of discharge and deemed stable and acceptable for discharge. The patient's chronic medical conditions were treated accordingly per the patient's home medication regimen. The patient's medication reconciliation (with changes made to chronic medications), follow up appointments, discharge orders, instructions, and significant lab and diagnostic studies are as noted.     Discharge follow up action items:     1. Follow up with PCP in 1-2 weeks.   2. Medication changes Brillinta   3. On hold medications Cilostazol     Patient's ordered code status: Full Code     Patient disposition: Home     Discharge Summary     Admit Date: 05-24-24  Discharge Date: 05-29-24    Admission diagnoses: Atherosclerosis of native coronary artery without angina pectoris    ST elevation myocardial infarction (STEMI)    Discharge diagnoses:  STEMI s/p PCI with JEREMY    Hospital Course:   For full details, please see H&P, progress notes, consult notes and ancillary notes. Briefly, DAQUAN WEST is a 64y Male with a history of CABG in 2018, PCI x3 prior to 2018, CKD3, HTN, DM, HLD, presenting with chest pain and shortness of breathing. The patient's hospital course will be summarized in a problem based format.    #ST elevation MI (STEMI)  Patient presented with chest pain, back pain and shortness of breathing to [facility]. He was transferred to [facility] for cardiac catheterization and found to have 90% stenosis of LIMA-LAD. He was initially unable to tolerate laying flat for cath so intervention was delayed. His respiratory and volume status was optimized and patient underwent repeat LHC with PCI JEREMY x 1 SVG to OM1. He will continue DAPT with ASA 81, Ticagrelor 90mg BID for 1 year. TTE with LVEF 59%, no WMA's    # HTN  Controlled inpatient with Carvedilol 6.25mg BID. Aldactone 12.5 mg to be added at discharge.     #DM (diabetes mellitus).   A1C this admission 8.4  Endocrine followed patient and adjusted insulin regimen.    On day of discharge, patient is clinically stable with no new exam findings or acute symptoms compared to prior. The patient was seen by the attending physician on the date of discharge and deemed stable and acceptable for discharge. The patient's chronic medical conditions were treated accordingly per the patient's home medication regimen. The patient's medication reconciliation (with changes made to chronic medications), follow up appointments, discharge orders, instructions, and significant lab and diagnostic studies are as noted.     Discharge follow up action items:     1. Follow up with PCP in 1-2 weeks.   2. Medication changes Brillinta 90 BID, Aldactone 12.5mg qdaily   3. On hold medications Cilostazol     Patient's ordered code status: Full Code     Patient disposition: Home     Discharge Summary     Admit Date: 05-24-24  Discharge Date: 05-29-24    Admission diagnoses: Atherosclerosis of native coronary artery without angina pectoris    ST elevation myocardial infarction (STEMI)    Discharge diagnoses:  STEMI s/p PCI with JEREMY    Hospital Course:   For full details, please see H&P, progress notes, consult notes and ancillary notes. Briefly, DAQUAN WEST is a 64y Male with a history of CABG in 2018, PCI x3 prior to 2018, CKD3, HTN, DM, HLD, presenting with chest pain and shortness of breathing. The patient's hospital course will be summarized in a problem based format.    #ST elevation MI (STEMI)  Patient presented with chest pain, back pain and shortness of breathing to [facility]. He was transferred to [facility] for cardiac catheterization and found to have 90% stenosis of LIMA-LAD. He was initially unable to tolerate laying flat for cath so intervention was delayed. His respiratory and volume status was optimized and patient underwent repeat LHC with PCI JEREMY x 1 SVG to OM1. He will continue DAPT with ASA 81, Ticagrelor 90mg BID for 1 year. TTE with LVEF 59%, no WMA's    # HTN  Controlled inpatient with Carvedilol 6.25mg BID. Aldactone 12.5 mg to be added at discharge.     #DM (diabetes mellitus).   A1C this admission 8.4  Endocrine followed patient and is discharged on his home regimen     On day of discharge, patient is clinically stable with no new exam findings or acute symptoms compared to prior. The patient was seen by the attending physician on the date of discharge and deemed stable and acceptable for discharge. The patient's chronic medical conditions were treated accordingly per the patient's home medication regimen. The patient's medication reconciliation (with changes made to chronic medications), follow up appointments, discharge orders, instructions, and significant lab and diagnostic studies are as noted.     Discharge follow up action items:     1. Follow up with PCP in 1-2 weeks. Follow up with Dr. Arboleda in 1 week.   2. Medication changes Brillinta 90 BID, Aldactone 12.5mg qdaily, Coreg 6.25mg BID  3. On hold medications Cilostazol     Patient's ordered code status: Full Code     Patient disposition: Home

## 2024-05-28 NOTE — DISCHARGE NOTE PROVIDER - NSDCMRMEDTOKEN_GEN_ALL_CORE_FT
Alphagan P 0.15% ophthalmic solution: 1 drop(s) in each affected eye 2 times a day  amLODIPine 5 mg oral tablet: 1 tab(s) orally once a day  aspirin 81 mg oral tablet, chewable: 1 tab(s) chewed once a day  atorvastatin 20 mg oral tablet: 1 tab(s) orally once a day  Brilinta (ticagrelor) 90 mg oral tablet: 1 tab(s) orally 2 times a day MDD: 2  Cardiac Rehab: Cardiac rehab 2-3 times a week x 12 weeks for coronary artery disease post PCI MDD: 1  cilostazol 100 mg oral tablet: 1 tab(s) orally once a day  Daily Edil tablet: 1 tablet orally once a day  docusate sodium 100 mg oral capsule: 1 cap(s) orally once a day  dorzolamide-timolol 2.23%-0.68% (2%-0.5% base) ophthalmic solution: 1 drop(s) in each eye 2 times a day  gabapentin 300 mg oral capsule: 1 cap(s) orally once a day  Janumet 50 mg-500 mg oral tablet: 1 tab(s) orally once a day  Lantus 100 units/mL subcutaneous solution: 30 international unit(s) subcutaneous once a day (at bedtime)  Lasix 40 mg oral tablet: 1 tab(s) orally once a day  latanoprost 0.005% ophthalmic solution: 1 drop(s) in each eye once a day (at bedtime)  levothyroxine 100 mcg (0.1 mg) oral tablet: 1 tab(s) orally once a day  losartan 50 mg oral tablet: 1 tab(s) orally once a day  Metoprolol Tartrate 25 mg oral tablet: 1 tab(s) orally once a day  nateglinide 60 mg oral tablet: 1 tab(s) orally once a day  Plavix 75 mg oral tablet: 1 tab(s) orally once a day  Rhopressa 0.02% ophthalmic solution: 1 drop(s) in each eye once a day (in the evening)  Vitamin D2 1.25mg ([id number]) capsule: 1 capsule orally once a week  Zetia 10 mg oral tablet: 1 tab(s) orally once a day   Alphagan P 0.15% ophthalmic solution: 1 drop(s) in each affected eye 2 times a day  amLODIPine 5 mg oral tablet: 1 tab(s) orally once a day  aspirin 81 mg oral tablet, chewable: 1 tab(s) chewed once a day  atorvastatin 20 mg oral tablet: 1 tab(s) orally once a day  Brilinta (ticagrelor) 90 mg oral tablet: 1 tab(s) orally 2 times a day MDD: 2  Cardiac Rehab: Cardiac rehab 2-3 times a week x 12 weeks for coronary artery disease post PCI MDD: 1  Daily Edil tablet: 1 tablet orally once a day  docusate sodium 100 mg oral capsule: 1 cap(s) orally once a day  dorzolamide-timolol 2.23%-0.68% (2%-0.5% base) ophthalmic solution: 1 drop(s) in each eye 2 times a day  gabapentin 300 mg oral capsule: 1 cap(s) orally once a day  Janumet 50 mg-500 mg oral tablet: 1 tab(s) orally once a day  Lantus 100 units/mL subcutaneous solution: 30 international unit(s) subcutaneous once a day (at bedtime)  Lasix 40 mg oral tablet: 1 tab(s) orally once a day  latanoprost 0.005% ophthalmic solution: 1 drop(s) in each eye once a day (at bedtime)  levothyroxine 100 mcg (0.1 mg) oral tablet: 1 tab(s) orally once a day  losartan 50 mg oral tablet: 1 tab(s) orally once a day  Metoprolol Tartrate 25 mg oral tablet: 1 tab(s) orally once a day  nateglinide 60 mg oral tablet: 1 tab(s) orally once a day  Rhopressa 0.02% ophthalmic solution: 1 drop(s) in each eye once a day (in the evening)  Vitamin D2 1.25mg ([id number]) capsule: 1 capsule orally once a week  Zetia 10 mg oral tablet: 1 tab(s) orally once a day   Alphagan P 0.15% ophthalmic solution: 1 drop(s) in each affected eye 2 times a day  amLODIPine 5 mg oral tablet: 1 tab(s) orally once a day  aspirin 81 mg oral tablet, chewable: 1 tab(s) chewed once a day  atorvastatin 20 mg oral tablet: 1 tab(s) orally once a day  Brilinta (ticagrelor) 90 mg oral tablet: 1 tab(s) orally 2 times a day MDD: 2  Cardiac Rehab: Cardiac rehab 2-3 times a week x 12 weeks for coronary artery disease post PCI MDD: 1  Daily Edil tablet: 1 tablet orally once a day  docusate sodium 100 mg oral capsule: 1 cap(s) orally once a day  dorzolamide-timolol 2.23%-0.68% (2%-0.5% base) ophthalmic solution: 1 drop(s) in each eye 2 times a day  gabapentin 300 mg oral capsule: 1 cap(s) orally once a day  Janumet 50 mg-500 mg oral tablet: 1 tab(s) orally once a day  Lantus 100 units/mL subcutaneous solution: 30 international unit(s) subcutaneous once a day (at bedtime)  Lasix 40 mg oral tablet: 1 tab(s) orally once a day  latanoprost 0.005% ophthalmic solution: 1 drop(s) in each eye once a day (at bedtime)  levothyroxine 100 mcg (0.1 mg) oral tablet: 1 tab(s) orally once a day  nateglinide 60 mg oral tablet: 1 tab(s) orally once a day  Rhopressa 0.02% ophthalmic solution: 1 drop(s) in each eye once a day (in the evening)  Vitamin D2 1.25mg ([id number]) capsule: 1 capsule orally once a week  Zetia 10 mg oral tablet: 1 tab(s) orally once a day   alcohol swabs: Apply topically to affected area 4 times a day  Aldactone 25 mg oral tablet: 0.5 tab(s) orally once a day  Alphagan P 0.15% ophthalmic solution: 1 drop(s) in each affected eye 2 times a day  amLODIPine 5 mg oral tablet: 1 tab(s) orally once a day  aspirin 81 mg oral tablet, chewable: 1 tab(s) chewed once a day  atorvastatin 20 mg oral tablet: 1 tab(s) orally once a day  Brilinta (ticagrelor) 90 mg oral tablet: 1 tab(s) orally 2 times a day MDD: 2  Cardiac Rehab: Cardiac rehab 2-3 times a week x 12 weeks for coronary artery disease post PCI MDD: 1  carvedilol 6.25 mg oral tablet: 1 tab(s) orally every 12 hours  Daily Edil tablet: 1 tablet orally once a day  docusate sodium 100 mg oral capsule: 1 cap(s) orally once a day  dorzolamide-timolol 2.23%-0.68% (2%-0.5% base) ophthalmic solution: 1 drop(s) in each eye 2 times a day  gabapentin 300 mg oral capsule: 1 cap(s) orally once a day  Insulin Pen Needles, 4mm: 1 application subcutaneously 4 times a day. ** Use with insulin pen **  Janumet 50 mg-500 mg oral tablet: 1 tab(s) orally once a day  lancets: 1 application subcutaneously 4 times a day  Lantus Solostar Pen 100 units/mL subcutaneous solution: 30 unit(s) subcutaneous once a day (at bedtime)  Lasix 40 mg oral tablet: 1 tab(s) orally once a day  latanoprost 0.005% ophthalmic solution: 1 drop(s) in each eye once a day (at bedtime)  levothyroxine 100 mcg (0.1 mg) oral tablet: 1 tab(s) orally once a day  nateglinide 60 mg oral tablet: 1 tab(s) orally once a day  Rhopressa 0.02% ophthalmic solution: 1 drop(s) in each eye once a day (in the evening)  Vitamin D2 1.25mg (82187KV) capsule: 1 capsule orally once a week  Zetia 10 mg oral tablet: 1 tab(s) orally once a day   alcohol swabs: Apply topically to affected area 4 times a day  Aldactone 25 mg oral tablet: 0.5 tab(s) orally once a day  Alphagan P 0.15% ophthalmic solution: 1 drop(s) in each affected eye 2 times a day  aspirin 81 mg oral tablet, chewable: 1 tab(s) chewed once a day  atorvastatin 80 mg oral tablet: 1 tab(s) orally once a day (at bedtime)  Brilinta (ticagrelor) 90 mg oral tablet: 1 tab(s) orally 2 times a day MDD: 2  Cardiac Rehab: Cardiac rehab 2-3 times a week x 12 weeks for coronary artery disease post PCI MDD: 1  carvedilol 6.25 mg oral tablet: 1 tab(s) orally every 12 hours  Daily Edil tablet: 1 tablet orally once a day  docusate sodium 100 mg oral capsule: 1 cap(s) orally once a day  dorzolamide-timolol 2.23%-0.68% (2%-0.5% base) ophthalmic solution: 1 drop(s) in each eye 2 times a day  gabapentin 300 mg oral capsule: 1 cap(s) orally 2 times a day  glucometer (per patient&#x27;s insurance): Test blood sugars four times a day. Dispense #1 glucometer.  Insulin Pen Needles, 4mm: 1 application subcutaneously 4 times a day. ** Use with insulin pen **  Janumet 50 mg-500 mg oral tablet: 1 tab(s) orally once a day  lancets: 1 application subcutaneously 4 times a day  Lantus Solostar Pen 100 units/mL subcutaneous solution: 30 unit(s) subcutaneous once a day (at bedtime)  Lasix 40 mg oral tablet: 1 tab(s) orally once a day  latanoprost 0.005% ophthalmic solution: 1 drop(s) in each eye once a day (at bedtime)  levothyroxine 100 mcg (0.1 mg) oral tablet: 1 tab(s) orally once a day  nateglinide 60 mg oral tablet: 1 tab(s) orally once a day  Rhopressa 0.02% ophthalmic solution: 1 drop(s) in each eye once a day (in the evening)  test strips (per patient&#x27;s insurance): 1 application subcutaneously 4 times a day. ** Compatible with patient&#x27;s glucometer **  Vitamin D2 1.25mg ([id number]) capsule: 1 capsule orally once a week  Zetia 10 mg oral tablet: 1 tab(s) orally once a day

## 2024-05-28 NOTE — PROVIDER CONTACT NOTE (OTHER) - RECOMMENDATIONS
Provider Rodrigo Blackburn notified
Made NP aware.
Provider made aware, To assess patient at bedside. ice packs admin; leg elevated
EKG ordered  STAT Hydralazine IVP

## 2024-05-28 NOTE — PROGRESS NOTE ADULT - PROBLEM SELECTOR PLAN 3
A1C this admission 8.4      - Insulin Sliding Scale   - Lantus 38u qHS and admelog 10 units pre-meals  - Hold home medications  - Endocrine following

## 2024-05-29 ENCOUNTER — NON-APPOINTMENT (OUTPATIENT)
Age: 64
End: 2024-05-29

## 2024-05-29 VITALS
RESPIRATION RATE: 18 BRPM | OXYGEN SATURATION: 97 % | HEART RATE: 58 BPM | SYSTOLIC BLOOD PRESSURE: 159 MMHG | TEMPERATURE: 98 F | DIASTOLIC BLOOD PRESSURE: 72 MMHG

## 2024-05-29 LAB
ALBUMIN SERPL ELPH-MCNC: 3.6 G/DL — SIGNIFICANT CHANGE UP (ref 3.3–5)
ALP SERPL-CCNC: 64 U/L — SIGNIFICANT CHANGE UP (ref 40–120)
ALT FLD-CCNC: 29 U/L — SIGNIFICANT CHANGE UP (ref 10–45)
ANION GAP SERPL CALC-SCNC: 13 MMOL/L — SIGNIFICANT CHANGE UP (ref 5–17)
AST SERPL-CCNC: 31 U/L — SIGNIFICANT CHANGE UP (ref 10–40)
BASOPHILS # BLD AUTO: 0.01 K/UL — SIGNIFICANT CHANGE UP (ref 0–0.2)
BASOPHILS NFR BLD AUTO: 0.1 % — SIGNIFICANT CHANGE UP (ref 0–2)
BILIRUB SERPL-MCNC: 0.8 MG/DL — SIGNIFICANT CHANGE UP (ref 0.2–1.2)
BUN SERPL-MCNC: 24 MG/DL — HIGH (ref 7–23)
CALCIUM SERPL-MCNC: 9.4 MG/DL — SIGNIFICANT CHANGE UP (ref 8.4–10.5)
CHLORIDE SERPL-SCNC: 102 MMOL/L — SIGNIFICANT CHANGE UP (ref 96–108)
CO2 SERPL-SCNC: 21 MMOL/L — LOW (ref 22–31)
CREAT SERPL-MCNC: 1.6 MG/DL — HIGH (ref 0.5–1.3)
EGFR: 47 ML/MIN/1.73M2 — LOW
EOSINOPHIL # BLD AUTO: 0.53 K/UL — HIGH (ref 0–0.5)
EOSINOPHIL NFR BLD AUTO: 6.7 % — HIGH (ref 0–6)
GLUCOSE BLDC GLUCOMTR-MCNC: 138 MG/DL — HIGH (ref 70–99)
GLUCOSE BLDC GLUCOMTR-MCNC: 180 MG/DL — HIGH (ref 70–99)
GLUCOSE SERPL-MCNC: 146 MG/DL — HIGH (ref 70–99)
HCT VFR BLD CALC: 41.6 % — SIGNIFICANT CHANGE UP (ref 39–50)
HGB BLD-MCNC: 14.6 G/DL — SIGNIFICANT CHANGE UP (ref 13–17)
IMM GRANULOCYTES NFR BLD AUTO: 0.8 % — SIGNIFICANT CHANGE UP (ref 0–0.9)
LYMPHOCYTES # BLD AUTO: 1.68 K/UL — SIGNIFICANT CHANGE UP (ref 1–3.3)
LYMPHOCYTES # BLD AUTO: 21.1 % — SIGNIFICANT CHANGE UP (ref 13–44)
MAGNESIUM SERPL-MCNC: 2.2 MG/DL — SIGNIFICANT CHANGE UP (ref 1.6–2.6)
MCHC RBC-ENTMCNC: 29 PG — SIGNIFICANT CHANGE UP (ref 27–34)
MCHC RBC-ENTMCNC: 35.1 GM/DL — SIGNIFICANT CHANGE UP (ref 32–36)
MCV RBC AUTO: 82.7 FL — SIGNIFICANT CHANGE UP (ref 80–100)
MONOCYTES # BLD AUTO: 1.15 K/UL — HIGH (ref 0–0.9)
MONOCYTES NFR BLD AUTO: 14.4 % — HIGH (ref 2–14)
NEUTROPHILS # BLD AUTO: 4.53 K/UL — SIGNIFICANT CHANGE UP (ref 1.8–7.4)
NEUTROPHILS NFR BLD AUTO: 56.9 % — SIGNIFICANT CHANGE UP (ref 43–77)
NRBC # BLD: 0 /100 WBCS — SIGNIFICANT CHANGE UP (ref 0–0)
PHOSPHATE SERPL-MCNC: 3.5 MG/DL — SIGNIFICANT CHANGE UP (ref 2.5–4.5)
PLATELET # BLD AUTO: 231 K/UL — SIGNIFICANT CHANGE UP (ref 150–400)
POTASSIUM SERPL-MCNC: 3.8 MMOL/L — SIGNIFICANT CHANGE UP (ref 3.5–5.3)
POTASSIUM SERPL-SCNC: 3.8 MMOL/L — SIGNIFICANT CHANGE UP (ref 3.5–5.3)
PROT SERPL-MCNC: 7.5 G/DL — SIGNIFICANT CHANGE UP (ref 6–8.3)
RBC # BLD: 5.03 M/UL — SIGNIFICANT CHANGE UP (ref 4.2–5.8)
RBC # FLD: 12.9 % — SIGNIFICANT CHANGE UP (ref 10.3–14.5)
SODIUM SERPL-SCNC: 136 MMOL/L — SIGNIFICANT CHANGE UP (ref 135–145)
WBC # BLD: 7.96 K/UL — SIGNIFICANT CHANGE UP (ref 3.8–10.5)
WBC # FLD AUTO: 7.96 K/UL — SIGNIFICANT CHANGE UP (ref 3.8–10.5)

## 2024-05-29 PROCEDURE — 84484 ASSAY OF TROPONIN QUANT: CPT

## 2024-05-29 PROCEDURE — 82803 BLOOD GASES ANY COMBINATION: CPT

## 2024-05-29 PROCEDURE — 93325 DOPPLER ECHO COLOR FLOW MAPG: CPT

## 2024-05-29 PROCEDURE — C8924: CPT

## 2024-05-29 PROCEDURE — 85520 HEPARIN ASSAY: CPT

## 2024-05-29 PROCEDURE — 80053 COMPREHEN METABOLIC PANEL: CPT

## 2024-05-29 PROCEDURE — 97530 THERAPEUTIC ACTIVITIES: CPT

## 2024-05-29 PROCEDURE — 93459 L HRT ART/GRFT ANGIO: CPT

## 2024-05-29 PROCEDURE — 80048 BASIC METABOLIC PNL TOTAL CA: CPT

## 2024-05-29 PROCEDURE — 93308 TTE F-UP OR LMTD: CPT

## 2024-05-29 PROCEDURE — C1874: CPT

## 2024-05-29 PROCEDURE — 94660 CPAP INITIATION&MGMT: CPT

## 2024-05-29 PROCEDURE — 85014 HEMATOCRIT: CPT

## 2024-05-29 PROCEDURE — ZZZZZ: CPT

## 2024-05-29 PROCEDURE — 84443 ASSAY THYROID STIM HORMONE: CPT

## 2024-05-29 PROCEDURE — C1894: CPT

## 2024-05-29 PROCEDURE — 83735 ASSAY OF MAGNESIUM: CPT

## 2024-05-29 PROCEDURE — C1769: CPT

## 2024-05-29 PROCEDURE — 81003 URINALYSIS AUTO W/O SCOPE: CPT

## 2024-05-29 PROCEDURE — 84100 ASSAY OF PHOSPHORUS: CPT

## 2024-05-29 PROCEDURE — 82330 ASSAY OF CALCIUM: CPT

## 2024-05-29 PROCEDURE — 71045 X-RAY EXAM CHEST 1 VIEW: CPT

## 2024-05-29 PROCEDURE — C1725: CPT

## 2024-05-29 PROCEDURE — 82553 CREATINE MB FRACTION: CPT

## 2024-05-29 PROCEDURE — 92937 PRQ TRLUML REVSC CAB GRF 1: CPT | Mod: LC

## 2024-05-29 PROCEDURE — 80061 LIPID PANEL: CPT

## 2024-05-29 PROCEDURE — 87640 STAPH A DNA AMP PROBE: CPT

## 2024-05-29 PROCEDURE — 85730 THROMBOPLASTIN TIME PARTIAL: CPT

## 2024-05-29 PROCEDURE — 85018 HEMOGLOBIN: CPT

## 2024-05-29 PROCEDURE — 93005 ELECTROCARDIOGRAM TRACING: CPT

## 2024-05-29 PROCEDURE — 84295 ASSAY OF SERUM SODIUM: CPT

## 2024-05-29 PROCEDURE — 85610 PROTHROMBIN TIME: CPT

## 2024-05-29 PROCEDURE — 97161 PT EVAL LOW COMPLEX 20 MIN: CPT

## 2024-05-29 PROCEDURE — 86901 BLOOD TYPING SEROLOGIC RH(D): CPT

## 2024-05-29 PROCEDURE — 83880 ASSAY OF NATRIURETIC PEPTIDE: CPT

## 2024-05-29 PROCEDURE — 99232 SBSQ HOSP IP/OBS MODERATE 35: CPT

## 2024-05-29 PROCEDURE — 36415 COLL VENOUS BLD VENIPUNCTURE: CPT

## 2024-05-29 PROCEDURE — 82947 ASSAY GLUCOSE BLOOD QUANT: CPT

## 2024-05-29 PROCEDURE — 82962 GLUCOSE BLOOD TEST: CPT

## 2024-05-29 PROCEDURE — 82550 ASSAY OF CK (CPK): CPT

## 2024-05-29 PROCEDURE — 86900 BLOOD TYPING SEROLOGIC ABO: CPT

## 2024-05-29 PROCEDURE — 83605 ASSAY OF LACTIC ACID: CPT

## 2024-05-29 PROCEDURE — 82435 ASSAY OF BLOOD CHLORIDE: CPT

## 2024-05-29 PROCEDURE — 97116 GAIT TRAINING THERAPY: CPT

## 2024-05-29 PROCEDURE — C1887: CPT

## 2024-05-29 PROCEDURE — 87641 MR-STAPH DNA AMP PROBE: CPT

## 2024-05-29 PROCEDURE — 85027 COMPLETE CBC AUTOMATED: CPT

## 2024-05-29 PROCEDURE — 86850 RBC ANTIBODY SCREEN: CPT

## 2024-05-29 PROCEDURE — 99239 HOSP IP/OBS DSCHRG MGMT >30: CPT

## 2024-05-29 PROCEDURE — 83036 HEMOGLOBIN GLYCOSYLATED A1C: CPT

## 2024-05-29 PROCEDURE — 84132 ASSAY OF SERUM POTASSIUM: CPT

## 2024-05-29 PROCEDURE — 85025 COMPLETE CBC W/AUTO DIFF WBC: CPT

## 2024-05-29 RX ORDER — ISOPROPYL ALCOHOL, BENZOCAINE .7; .06 ML/ML; ML/ML
0 SWAB TOPICAL
Qty: 100 | Refills: 1
Start: 2024-05-29

## 2024-05-29 RX ORDER — INSULIN GLARGINE 100 [IU]/ML
30 INJECTION, SOLUTION SUBCUTANEOUS
Qty: 3 | Refills: 0
Start: 2024-05-29 | End: 2024-06-27

## 2024-05-29 RX ORDER — CILOSTAZOL 100 MG/1
1 TABLET ORAL
Refills: 0 | DISCHARGE

## 2024-05-29 RX ORDER — GABAPENTIN 400 MG/1
1 CAPSULE ORAL
Qty: 0 | Refills: 0 | DISCHARGE

## 2024-05-29 RX ORDER — INSULIN GLARGINE 100 [IU]/ML
30 INJECTION, SOLUTION SUBCUTANEOUS
Qty: 5 | Refills: 0
Start: 2024-05-29

## 2024-05-29 RX ORDER — CARVEDILOL PHOSPHATE 80 MG/1
1 CAPSULE, EXTENDED RELEASE ORAL
Qty: 60 | Refills: 0
Start: 2024-05-29 | End: 2024-06-27

## 2024-05-29 RX ORDER — METOPROLOL TARTRATE 50 MG
1 TABLET ORAL
Refills: 0 | DISCHARGE

## 2024-05-29 RX ORDER — ATORVASTATIN CALCIUM 80 MG/1
1 TABLET, FILM COATED ORAL
Refills: 0 | DISCHARGE

## 2024-05-29 RX ORDER — CLOPIDOGREL BISULFATE 75 MG/1
1 TABLET, FILM COATED ORAL
Refills: 0 | DISCHARGE

## 2024-05-29 RX ORDER — AMLODIPINE BESYLATE 2.5 MG/1
1 TABLET ORAL
Refills: 0 | DISCHARGE

## 2024-05-29 RX ORDER — ATORVASTATIN CALCIUM 80 MG/1
1 TABLET, FILM COATED ORAL
Qty: 30 | Refills: 0
Start: 2024-05-29 | End: 2024-06-27

## 2024-05-29 RX ORDER — INSULIN GLARGINE 100 [IU]/ML
30 INJECTION, SOLUTION SUBCUTANEOUS
Refills: 0 | DISCHARGE

## 2024-05-29 RX ORDER — SPIRONOLACTONE 25 MG/1
0.5 TABLET, FILM COATED ORAL
Qty: 15 | Refills: 0
Start: 2024-05-29 | End: 2024-06-27

## 2024-05-29 RX ORDER — LOSARTAN POTASSIUM 100 MG/1
1 TABLET, FILM COATED ORAL
Refills: 0 | DISCHARGE

## 2024-05-29 RX ADMIN — Medication 81 MILLIGRAM(S): at 12:05

## 2024-05-29 RX ADMIN — Medication 12 UNIT(S): at 12:05

## 2024-05-29 RX ADMIN — Medication 1 DROP(S): at 05:53

## 2024-05-29 RX ADMIN — CHLORHEXIDINE GLUCONATE 1 APPLICATION(S): 213 SOLUTION TOPICAL at 12:05

## 2024-05-29 RX ADMIN — Medication 40 MILLIGRAM(S): at 05:52

## 2024-05-29 RX ADMIN — POLYETHYLENE GLYCOL 3350 17 GRAM(S): 17 POWDER, FOR SOLUTION ORAL at 12:05

## 2024-05-29 RX ADMIN — DORZOLAMIDE HYDROCHLORIDE TIMOLOL MALEATE 1 DROP(S): 20; 5 SOLUTION/ DROPS OPHTHALMIC at 05:53

## 2024-05-29 RX ADMIN — TICAGRELOR 90 MILLIGRAM(S): 90 TABLET ORAL at 05:52

## 2024-05-29 RX ADMIN — Medication 2: at 08:01

## 2024-05-29 RX ADMIN — GABAPENTIN 300 MILLIGRAM(S): 400 CAPSULE ORAL at 13:29

## 2024-05-29 RX ADMIN — Medication 13 UNIT(S): at 08:00

## 2024-05-29 RX ADMIN — Medication 100 MICROGRAM(S): at 05:52

## 2024-05-29 RX ADMIN — Medication 1 DROP(S): at 12:06

## 2024-05-29 NOTE — PHARMACOTHERAPY INTERVENTION NOTE - COMMENTS
Counseled patient/patient’s caregiver  on the following inpatient/discharge medications names (brand/generic), indication, and possible side effects:    artificial  tears Solution 1 Drop(s) Both EYES three times a day  aspirin  chewable 81 milliGRAM(s) Oral daily  atorvastatin 80 milliGRAM(s) Oral at bedtime  carvedilol 6.25 milliGRAM(s) Oral every 12 hours  dorzolamide 2%/timolol 0.5% Ophthalmic Solution 1 Drop(s) Both EYES two times a day  furosemide    Tablet 40 milliGRAM(s) Oral daily  gabapentin 300 milliGRAM(s) Oral daily  insulin glargine Injectable (LANTUS) 42 Unit(s) SubCutaneous at bedtime  insulin lispro Injectable (ADMELOG) 10 Unit(s) SubCutaneous before dinner  insulin lispro Injectable (ADMELOG) 13 Unit(s) SubCutaneous before breakfast  insulin lispro Injectable (ADMELOG) 12 Unit(s) SubCutaneous before lunch  latanoprost 0.005% Ophthalmic Solution 1 Drop(s) Both EYES at bedtime  levothyroxine 100 MICROGram(s) Oral daily  Netarsudil (Rhopressa) 0.02% opth soln 1 Drop(s) 1 Drop(s) Both EYES at bedtime  ticagrelor 90 milliGRAM(s) Oral every 12 hours    - Stressed importance of compliance to DAPT therapy.  - Educated patient to avoid using NSAIDs  (Aleve, Motrin, ibuprofen, naproxen) while on DAPT, recommended to use Tylenol OTC for pain control (not to exceed 4gm in 24 hours)  - Patient was provided with a medication card for their new medication. Patient questions and concerns were answered and addressed. Patient demonstrated understanding.  - Pt would like all discharge RXs sent to [facility]. Write in pharmacy note: "meds to bed, date (MM:DD), time (HH:MM) on the scripts.     -Patient's daughter said [facility] was having trouble filling their insulin prescription. Requested scripts for insulin and pen needles.     Gerardo Ann (Jian Ming)  Transitions of Care Pharmacist  Available on [employer] Teams (Preferred)  Spectra: [phone]  Counseled patient/patient’s caregiver  on the following inpatient/discharge medications names (brand/generic), indication, and possible side effects:    artificial  tears Solution 1 Drop(s) Both EYES three times a day  aspirin  chewable 81 milliGRAM(s) Oral daily  atorvastatin 80 milliGRAM(s) Oral at bedtime  carvedilol 6.25 milliGRAM(s) Oral every 12 hours  dorzolamide 2%/timolol 0.5% Ophthalmic Solution 1 Drop(s) Both EYES two times a day  furosemide    Tablet 40 milliGRAM(s) Oral daily  gabapentin 300 milliGRAM(s) Oral daily  insulin glargine Injectable (LANTUS) 42 Unit(s) SubCutaneous at bedtime  insulin lispro Injectable (ADMELOG) 10 Unit(s) SubCutaneous before dinner  insulin lispro Injectable (ADMELOG) 13 Unit(s) SubCutaneous before breakfast  insulin lispro Injectable (ADMELOG) 12 Unit(s) SubCutaneous before lunch  latanoprost 0.005% Ophthalmic Solution 1 Drop(s) Both EYES at bedtime  levothyroxine 100 MICROGram(s) Oral daily  Netarsudil (Rhopressa) 0.02% opth soln 1 Drop(s) 1 Drop(s) Both EYES at bedtime  ticagrelor 90 milliGRAM(s) Oral every 12 hours    Brilinta 90 mg prescription was sent to [facility] pharmacy. The cost is $0/month with patient's insurance plan.     - Stressed importance of compliance to DAPT therapy.  - Educated patient to avoid using NSAIDs  (Aleve, Motrin, ibuprofen, naproxen) while on DAPT, recommended to use Tylenol OTC for pain control (not to exceed 4gm in 24 hours)  - Patient was provided with a medication card for their new medication. Patient questions and concerns were answered and addressed. Patient demonstrated understanding.  - Pt would like all discharge RXs sent to [facility]. Write in pharmacy note: "meds to bed, date (MM:DD), time (HH:MM) on the scripts.     -Patient's daughter said [facility] was having trouble [facility] their insulin prescription. Requested scripts for insulin and pen needles.     Gerardo Ann (Jian Ming)  Transitions of Care Pharmacist  Available on Microsoft Teams (Preferred)  Spectra: [phone]

## 2024-05-29 NOTE — PROGRESS NOTE ADULT - PROBLEM SELECTOR PLAN 2
-Carvedilol 6.25mg BID

## 2024-05-29 NOTE — PROGRESS NOTE ADULT - PROBLEM SELECTOR PLAN 5
Lipid panel this admission: LDL 38, HDL 39, Trig 84     -C/w Atorvastatin 80mg qdaily

## 2024-05-29 NOTE — PROGRESS NOTE ADULT - PROBLEM SELECTOR PLAN 6
-Cosopt (dorzolamide/timolol) to each eye BID  -Xalatan (latanoprost) eyedrop at bedtime

## 2024-05-29 NOTE — PROGRESS NOTE ADULT - PROBLEM SELECTOR PROBLEM 1
ST elevation MI (STEMI)

## 2024-05-29 NOTE — PROGRESS NOTE ADULT - PROBLEM SELECTOR PLAN 8
Dvt ppx: Subq heparin  Diet: DASH/TLC CC diet  GOC: Full Code

## 2024-05-29 NOTE — DISCHARGE NOTE NURSING/CASE MANAGEMENT/SOCIAL WORK - NSSCTYPOFSERV_GEN_ALL_CORE
Visiting nurse will call 1-2 days after discharge to arrange visit. Please call agency with any question or concerns

## 2024-05-29 NOTE — PROGRESS NOTE ADULT - PROBLEM SELECTOR PLAN 7
S/p PCI x3  S/p CABG in 2018
S/p PCI x3  S/p CABG in 2018  S/p PCI JEREMY x 1 SVG to OM1 (5/28/2024)
S/p PCI x3  S/p CABG in 2018

## 2024-05-29 NOTE — PROGRESS NOTE ADULT - SUBJECTIVE AND OBJECTIVE BOX
Interventional Cardiology Post Cath Progress Note:                Subjective:   Patient feels well- no current complaints- Denies chest pain, shortness of breath. Denies pain, numbness, tingling or swelling around wrist access site    Tele: SB 50s      MEDICATIONS  (STANDING):  artificial  tears Solution 1 Drop(s) Both EYES three times a day  aspirin  chewable 81 milliGRAM(s) Oral daily  atorvastatin 80 milliGRAM(s) Oral at bedtime  carvedilol 6.25 milliGRAM(s) Oral every 12 hours  chlorhexidine 2% Cloths 1 Application(s) Topical daily  dorzolamide 2%/timolol 0.5% Ophthalmic Solution 1 Drop(s) Both EYES two times a day  furosemide    Tablet 40 milliGRAM(s) Oral daily  gabapentin 300 milliGRAM(s) Oral daily  insulin glargine Injectable (LANTUS) 42 Unit(s) SubCutaneous at bedtime  insulin lispro (ADMELOG) corrective regimen sliding scale   SubCutaneous at bedtime  insulin lispro (ADMELOG) corrective regimen sliding scale   SubCutaneous three times a day before meals  insulin lispro Injectable (ADMELOG) 10 Unit(s) SubCutaneous before dinner  insulin lispro Injectable (ADMELOG) 13 Unit(s) SubCutaneous before breakfast  insulin lispro Injectable (ADMELOG) 12 Unit(s) SubCutaneous before lunch  latanoprost 0.005% Ophthalmic Solution 1 Drop(s) Both EYES at bedtime  levothyroxine 100 MICROGram(s) Oral daily  melatonin 5 milliGRAM(s) Oral at bedtime  Netarsudil (Rhopressa) 0.02% opth soln 1 Drop(s) 1 Drop(s) Both EYES at bedtime  polyethylene glycol 3350 17 Gram(s) Oral daily  senna 2 Tablet(s) Oral at bedtime  sodium chloride 0.9%. 1000 milliLiter(s) (75 mL/Hr) IV Continuous <Continuous>  ticagrelor 90 milliGRAM(s) Oral every 12 hours    MEDICATIONS  (PRN):  acetaminophen     Tablet .. 650 milliGRAM(s) Oral every 6 hours PRN Temp greater or equal to 38C (100.4F), Mild Pain (1 - 3)  nitroglycerin     SubLingual 0.4 milliGRAM(s) SubLingual every 5 minutes PRN Chest Pain      Objective:  Vital Signs Last 24 Hrs  T(C): 36.4 (29 May 2024 11:10), Max: 36.8 (29 May 2024 08:00)  T(F): 97.5 (29 May 2024 11:10), Max: 98.2 (29 May 2024 08:00)  HR: 63 (29 May 2024 11:10) (50 - 67)  BP: 153/82 (29 May 2024 11:10) (128/70 - 217/96)  BP(mean): 102 (28 May 2024 14:50) (96 - 102)  RR: 18 (29 May 2024 11:10) (16 - 18)  SpO2: 96% (29 May 2024 11:10) (96% - 100%)    Parameters below as of 29 May 2024 11:10  Patient On (Oxygen Delivery Method): room air        05-28-24 @ 07:01  -  05-29-24 @ 07:00  --------------------------------------------------------  IN: 0 mL / OUT: 725 mL / NET: -725 mL    05-29-24 @ 07:01  -  05-29-24 @ 13:35  --------------------------------------------------------  IN: 500 mL / OUT: 500 mL / NET: 0 mL                              14.6   7.96  )-----------( 231      ( 29 May 2024 06:49 )             41.6     05-29    136  |  102  |  24<H>  ----------------------------<  146<H>  3.8   |  21<L>  |  1.60<H>    Ca    9.4      29 May 2024 06:48  Phos  3.5     05-29  Mg     2.2     05-29    TPro  7.5  /  Alb  3.6  /  TBili  0.8  /  DBili  x   /  AST  31  /  ALT  29  /  AlkPhos  64  05-29    PT/INR - ( 28 May 2024 07:02 )   PT: 11.3 sec;   INR: 1.08 ratio         PTT - ( 28 May 2024 07:02 )  PTT:37.8 sec  Urinalysis Basic - ( 29 May 2024 06:48 )    Color: x / Appearance: x / SG: x / pH: x  Gluc: 146 mg/dL / Ketone: x  / Bili: x / Urobili: x   Blood: x / Protein: x / Nitrite: x   Leuk Esterase: x / RBC: x / WBC x   Sq Epi: x / Non Sq Epi: x / Bacteria: x        CATH RESULTS:    Physical Exam:  No apparent distress, alert and oriented times three, appropriate affect  JVD is not elevated, supple  Clear to auscultation with no wheezing, ronchi or crackles  Regular rate and rhythm with no murmur, rub or gallop  Soft, non-tender, non-distendedRight   Upper Extremity: Radial site soft, non tender, no bleeding or hematoma; +2 palpable radial pulse      Assessment/Plan:  HPI:  65y/o M PMHx CABG in 2018, PCI x3 prior to 2018, CKD3, HTN, DM, HLD, presenting with chest pain and shortness of breathing. Patient BIBEMS to [facility]. EMS started patient on CPAP for hypoxia and increased WOB, and gave sublingual nitroglycerin x 2 and IV push lasix 60mg. Per ED note, patient with diffuse rales and wheezing and appearing volume overloaded. Patient received ASA, Brilinta, started on heparin gtt, and given 2g calcium due to elevated K+ on VBG. Patient transferred to [facility] for cardiac catheterization.   In cath lab, patient was evaluated, unable to tolerate lying flat and unable to be off BiPAP due to desaturation and tachycardia. Patient then transferred to CICU.   In CICU, patient alert, responsive, on BiPAP. Fs found to be >360, given 6u Admelog.   In J, labs notable for ProBNP > ~3800, K+ 6.5, AST 78, Cr 2.24, Lact 2.4. CXR shows evidence of pulmonary edema.  (24 May 2024 12:13)    s/p JEREMY SVG to OM1 via RRA on 5/28    - Procedure site stable.   - Continue DAPT (aspirin 81mg and brillinta 90mg)  - Continue atorvastatin  - Recommend a heart healthy diet which includes a variety of fruits and vegetables, whole grains, low fat dairy products, legumes and skinless poulty and fish; food prepared with little or no salt and minimize processed foods  - Avoid using NSAIDs  (Aleve, Motrin, ibuprofen, naproxen) while on DAPT, please utilize Tylenol for pain control (not to exceed 4gm in 24 hours)  -Follow up with primary cardiologist in 1-2 weeks  -Please make sure DAPT is prescribed to pt's preferred pharmacy on discharge  -Keep K>4 Mg>2  -For all general cardiology questions please contact patient's primary cards team   - Care per primary team    Please check [email or web address] password [email or web address] for cardiology service schedule and contact information via TEAMS.    CARLOS ENRIQUE Lagunas

## 2024-05-29 NOTE — DISCHARGE NOTE NURSING/CASE MANAGEMENT/SOCIAL WORK - PATIENT PORTAL LINK FT
You can access the FollowMyHealth Patient Portal offered by [facility] by registering at the following website: [email or web address]. By joining [employer]’s FollowMyHealth portal, you will also be able to view your health information using other applications (apps) compatible with our system.

## 2024-05-29 NOTE — PROGRESS NOTE ADULT - ASSESSMENT
65y/o M PMHx CABG in 2018, PCI x3 prior to 2018, CKD3, HTN, DM, HLD, presenting with chest pain and shortness of breathing. EKG showed STEMI pathology, patient transferred to cath lab for LHC, but unable to tolerate lying flat. Patient transferred to CICU for further monitoring on BiPAP, now weaned to room air and transferred to medicine service pending repeat cath.

## 2024-05-29 NOTE — PROGRESS NOTE ADULT - PROBLEM SELECTOR PLAN 1
Patient presented with chest pain, back pain and shortness of breathing to [facility]   Patient received ASA, Brilinta, started on heparin gtt, and given 2g calcium due to elevated K+ on VBG  Transferred to [facility] for cardiac catheterization   Found to have 90% stenosis of LIMA-LAD   No intervention performed as patient not able to tolerate laying flat   TTE with LVEF 59%, no WMA's    - DAPT: ASA 81, Ticagrelor 90mg BID for 1 year   - Plan for repeat cath once respiratory/renal function optimized   - Tele monitoring
Patient presented with chest pain, back pain and shortness of breathing to [facility]   Patient received ASA, Brilinta, started on heparin gtt, and given 2g calcium due to elevated K+ on VBG  Transferred to [facility] for cardiac catheterization   Found to have 90% stenosis of LIMA-LAD   No intervention performed as patient not able to tolerate laying flat   TTE with LVEF 59%, no WMA's    - DAPT: ASA 81, Ticagrelor 90mg BID for 1 year   - Plan for repeat cath today (5/28)   - Tele monitoring
Patient presented with chest pain, back pain and shortness of breathing to [facility]   Patient received ASA, Brilinta, started on heparin gtt, and given 2g calcium due to elevated K+ on VBG  Transferred to [facility] for cardiac catheterization   Found to have 90% stenosis of LIMA-LAD   No intervention performed as patient not able to tolerate laying flat   TTE with LVEF 59%, no WMA's    - DAPT: ASA 81, Ticagrelor 90mg BID for 1 year   - Plan for repeat cath (5/28) s/p LHC with PCI JEREMY x 1 SVG to OM1  - Tele monitoring
Patient presented with chest pain, back pain and shortness of breathing to [facility]   Patient received ASA, Brilinta, started on heparin gtt, and given 2g calcium due to elevated K+ on VBG  Transferred to [facility] for cardiac catheterization   Found to have 90% stenosis of LIMA-LAD   No intervention performed as patient not able to tolerate laying flat   TTE with LVEF 59%, no WMA's    - DAPT: ASA 81, Ticagrelor 90mg BID for 1 year   - Plan for repeat cath once respiratory/renal function optimized   - Tele monitoring
Patient [facility] chest pain, back pain and [facility]   Patient received ASA, Brilinta, started on heparin gtt, and given 2g calcium due to elevated K+ on VBG  Transferred to [facility] for cardiac catheterization   Found to have 90% stenosis of LIMA-LAD   No intervention performed as patient not able to tolerate laying flat   TTE with LVEF 59%, no WMA's    - DAPT: ASA 81, Ticagrelor 90mg BID for 1 year   - Plan for repeat cath once respiratory/renal function optimized   - Tele monitoring

## 2024-05-29 NOTE — PROGRESS NOTE ADULT - ATTENDING COMMENTS
-S/p LHC yesterday with PCI JEREMY x 1 SVG to OM1. Feels better. -D/w daughter and wife at bedside.   -Cards appreciated; will add spironolactone to DC regimen. Hold home amlodipine and losartan for now. C/w DAPT, statin, carvedilol.   -F/u endo recs for DC regimen.   -Holding home cilastazol while on DAPT.   -Stable for DC with outpatient cards and PMD f/u. Has appt early June with outpt cards per daughter.   -36 minutes spent on the DC process. -The patient was admitted and treated for acute on chronic diastolic heart failure.   -S/p LHC yesterday with PCI JEREMY x 1 SVG to OM1. Feels better. -D/w daughter and wife at bedside.   -Cards appreciated; will add spironolactone to DC regimen. Hold home amlodipine and losartan for now. C/w DAPT, statin, carvedilol.   -F/u endo recs for DC regimen.   -Holding home cilastazol while on DAPT.   -Stable for DC with outpatient cards and PMD f/u. Has appt early June with outpt cards per daughter.   -36 minutes spent on the DC process.

## 2024-05-29 NOTE — PROGRESS NOTE ADULT - SUBJECTIVE AND OBJECTIVE BOX
DATE OF SERVICE: 05-29-24 @ 09:16    Patient is a 64y old  Male who presents with a chief complaint of STEMI (28 May 2024 17:49)      SUBJECTIVE / OVERNIGHT EVENTS: Overnight, patient bradycardic to 30's, asymptomatic. Patient underwent LHC with PCI JEREMY x 1 SVG to OM1. Patient denies any chest pain, shortness of breath or abdominal pain. Small hematoma to right groin stable. Plan for discharge home today.     MEDICATIONS  (STANDING):  artificial  tears Solution 1 Drop(s) Both EYES three times a day  aspirin  chewable 81 milliGRAM(s) Oral daily  atorvastatin 80 milliGRAM(s) Oral at bedtime  carvedilol 6.25 milliGRAM(s) Oral every 12 hours  chlorhexidine 2% Cloths 1 Application(s) Topical daily  dorzolamide 2%/timolol 0.5% Ophthalmic Solution 1 Drop(s) Both EYES two times a day  furosemide    Tablet 40 milliGRAM(s) Oral daily  gabapentin 300 milliGRAM(s) Oral daily  insulin glargine Injectable (LANTUS) 42 Unit(s) SubCutaneous at bedtime  insulin lispro (ADMELOG) corrective regimen sliding scale   SubCutaneous at bedtime  insulin lispro (ADMELOG) corrective regimen sliding scale   SubCutaneous three times a day before meals  insulin lispro Injectable (ADMELOG) 10 Unit(s) SubCutaneous before dinner  insulin lispro Injectable (ADMELOG) 13 Unit(s) SubCutaneous before breakfast  insulin lispro Injectable (ADMELOG) 12 Unit(s) SubCutaneous before lunch  latanoprost 0.005% Ophthalmic Solution 1 Drop(s) Both EYES at bedtime  levothyroxine 100 MICROGram(s) Oral daily  melatonin 5 milliGRAM(s) Oral at bedtime  Netarsudil (Rhopressa) 0.02% opth soln 1 Drop(s) 1 Drop(s) Both EYES at bedtime  polyethylene glycol 3350 17 Gram(s) Oral daily  senna 2 Tablet(s) Oral at bedtime  sodium chloride 0.9%. 1000 milliLiter(s) (75 mL/Hr) IV Continuous <Continuous>  ticagrelor 90 milliGRAM(s) Oral every 12 hours    MEDICATIONS  (PRN):  acetaminophen     Tablet .. 650 milliGRAM(s) Oral every 6 hours PRN Temp greater or equal to 38C (100.4F), Mild Pain (1 - 3)  nitroglycerin     SubLingual 0.4 milliGRAM(s) SubLingual every 5 minutes PRN Chest Pain      Vital Signs Last 24 Hrs  T(C): 36.8 (29 May 2024 08:00), Max: 36.8 (29 May 2024 08:00)  T(F): 98.2 (29 May 2024 08:00), Max: 98.2 (29 May 2024 08:00)  HR: 60 (29 May 2024 08:00) (50 - 67)  BP: 128/70 (29 May 2024 08:00) (127/59 - 217/96)  BP(mean): 102 (28 May 2024 14:50) (87 - 118)  RR: 18 (29 May 2024 08:00) (16 - 18)  SpO2: 97% (29 May 2024 08:00) (97% - 100%)    Parameters below as of 29 May 2024 08:00  Patient On (Oxygen Delivery Method): room air      CAPILLARY BLOOD GLUCOSE      POCT Blood Glucose.: 180 mg/dL (29 May 2024 07:42)  POCT Blood Glucose.: 223 mg/dL (28 May 2024 21:09)  POCT Blood Glucose.: 230 mg/dL (28 May 2024 17:21)  POCT Blood Glucose.: 108 mg/dL (28 May 2024 11:49)    I&O's Summary    28 May 2024 07:01  -  29 May 2024 07:00  --------------------------------------------------------  IN: 0 mL / OUT: 725 mL / NET: -725 mL    29 May 2024 07:01  -  29 May 2024 09:16  --------------------------------------------------------  IN: 200 mL / OUT: 0 mL / NET: 200 mL        PHYSICAL EXAM:  GENERAL: NAD, well-developed  HEAD:  Atraumatic, Normocephalic  EYES: Redness to sclera   NECK: Supple, No JVD  CHEST/LUNG: Clear to auscultation bilaterally; No wheeze, chest wall with sternotomy scar   HEART: Regular rate and rhythm; No murmurs  ABDOMEN: Soft, Nontender, Nondistended  EXTREMITIES:  2+ Peripheral Pulses, No clubbing, cyanosis, or edema. LLE with scarring from CABG graft, small hematoma to right groin, right large toenail falling off  NEUROLOGY: non-focal    LABS:                        14.6   7.96  )-----------( 231      ( 29 May 2024 06:49 )             41.6     05-29    136  |  102  |  24<H>  ----------------------------<  146<H>  3.8   |  21<L>  |  1.60<H>    Ca    9.4      29 May 2024 06:48  Phos  3.5     05-29  Mg     2.2     05-29    TPro  7.5  /  Alb  3.6  /  TBili  0.8  /  DBili  x   /  AST  31  /  ALT  29  /  AlkPhos  64  05-29    PT/INR - ( 28 May 2024 07:02 )   PT: 11.3 sec;   INR: 1.08 ratio         PTT - ( 28 May 2024 07:02 )  PTT:37.8 sec  CARDIAC MARKERS ( 27 May 2024 10:45 )  x     / x     / x     / x     / 2.6 ng/mL      Urinalysis Basic - ( 29 May 2024 06:48 )    Color: x / Appearance: x / SG: x / pH: x  Gluc: 146 mg/dL / Ketone: x  / Bili: x / Urobili: x   Blood: x / Protein: x / Nitrite: x   Leuk Esterase: x / RBC: x / WBC x   Sq Epi: x / Non Sq Epi: x / Bacteria: x        RADIOLOGY & ADDITIONAL TESTS:    Imaging Personally Reviewed:    Consultant(s) Notes Reviewed:      Care Discussed with Consultants/Other Providers:

## 2024-05-29 NOTE — PROGRESS NOTE ADULT - ASSESSMENT
New ECG(s): Personally reviewed    Echo:  5/24/24   1. Left ventricular systolic function is normal with an ejection fraction of 59 % by Quintero's method of disks. There are no regional wall motion abnormalities seen.   2. Normal right ventricular cavity size, with normal wall thickness, and probably normal systolic function.   3. Mild mitral regurgitation.   4. Limited study-     No regional wall motion abnornality or flail mitral valve.   5. No prior echocardiogram is available for comparison.   6. There is normal LV mass and concentric remodeling.    Cath:  5/24/24 C showed diffuse disease, non-flow limiting 90% LIMA-LAD  5/28/24: S/p JEREMY to SVG-OM1    Interpretation of Telemetry: sinus rhythm 50s-60s, sinus pause lasting 2sec ~1030pm with jxnl escape    64M PMHx CABG (2018), PCI x3 prior to 2018, CKD3, HTN, DM, HLD, presenting with chest pain and shortness of breathing. EKG showed STEMI pathology, patient transferred to cath lab for LHC, unable to tolerate lying flat. Did have nonflow-limiting lesion of LIMA-LAD on diagnostic cath, but no intervention on account of TRANG. Patient transferred to CICU for further monitoring, now downgraded to floors. Now s/p JEREMY to SVG-OM1. Will plan for outpatient follow-up and optimization to determine if intervention on LIMA-LAD lesion is necessary    Recommendations:   - Continue aspirin, brillinta  - Continue atorva 80  - Continue carvedilol 6.25mg q12h  - can start spironolactone 12.5mg PO QDay for HFpEF  - no further inpatient cardiac testing required    All recommendations pending attending attestation. We will sign off, please re-consult with any concerns      Sakina Walters MD  PGY-4, Cardiology  Available on TEAMS    For all new consults  [email or web address]  Login: [email or web address]

## 2024-05-29 NOTE — PROGRESS NOTE ADULT - SUBJECTIVE AND OBJECTIVE BOX
Overnight Events: NAEO    Review Of Systems: No chest pain, shortness of breath, or palpitations            Current Meds:  acetaminophen     Tablet .. 650 milliGRAM(s) Oral every 6 hours PRN  artificial  tears Solution 1 Drop(s) Both EYES three times a day  aspirin  chewable 81 milliGRAM(s) Oral daily  atorvastatin 80 milliGRAM(s) Oral at bedtime  carvedilol 6.25 milliGRAM(s) Oral every 12 hours  chlorhexidine 2% Cloths 1 Application(s) Topical daily  dorzolamide 2%/timolol 0.5% Ophthalmic Solution 1 Drop(s) Both EYES two times a day  furosemide    Tablet 40 milliGRAM(s) Oral daily  gabapentin 300 milliGRAM(s) Oral daily  insulin glargine Injectable (LANTUS) 42 Unit(s) SubCutaneous at bedtime  insulin lispro (ADMELOG) corrective regimen sliding scale   SubCutaneous three times a day before meals  insulin lispro (ADMELOG) corrective regimen sliding scale   SubCutaneous at bedtime  insulin lispro Injectable (ADMELOG) 13 Unit(s) SubCutaneous before breakfast  insulin lispro Injectable (ADMELOG) 12 Unit(s) SubCutaneous before lunch  insulin lispro Injectable (ADMELOG) 10 Unit(s) SubCutaneous before dinner  latanoprost 0.005% Ophthalmic Solution 1 Drop(s) Both EYES at bedtime  levothyroxine 100 MICROGram(s) Oral daily  melatonin 5 milliGRAM(s) Oral at bedtime  Netarsudil (Rhopressa) 0.02% opth soln 1 Drop(s) 1 Drop(s) Both EYES at bedtime  nitroglycerin     SubLingual 0.4 milliGRAM(s) SubLingual every 5 minutes PRN  polyethylene glycol 3350 17 Gram(s) Oral daily  senna 2 Tablet(s) Oral at bedtime  sodium chloride 0.9%. 1000 milliLiter(s) IV Continuous <Continuous>  ticagrelor 90 milliGRAM(s) Oral every 12 hours      Vitals:  T(F): 97.5 (05-29), Max: 98.2 (05-29)  HR: 63 (05-29) (50 - 67)  BP: 153/82 (05-29) (128/70 - 217/96)  RR: 18 (05-29)  SpO2: 96% (05-29)  I&O's Summary    28 May 2024 07:01  -  29 May 2024 07:00  --------------------------------------------------------  IN: 0 mL / OUT: 725 mL / NET: -725 mL    29 May 2024 07:01  -  29 May 2024 14:36  --------------------------------------------------------  IN: 500 mL / OUT: 500 mL / NET: 0 mL        Physical Exam:  GEN: comfortable appearing, lying in bed in NAD  HEENT: NCAT, MMM  CV: Regular S1, S2, no m/r/g  RESP: CTAB  ABD: Soft, NTND, +BS  EXT: No LE edema, WWP, pulses palpable throughout  NEURO: No focal deficits, AOx3  SKIN:  No rashes                          14.6   7.96  )-----------( 231      ( 29 May 2024 06:49 )             41.6     05-29    136  |  102  |  24<H>  ----------------------------<  146<H>  3.8   |  21<L>  |  1.60<H>    Ca    9.4      29 May 2024 06:48  Phos  3.5     05-29  Mg     2.2     05-29    TPro  7.5  /  Alb  3.6  /  TBili  0.8  /  DBili  x   /  AST  31  /  ALT  29  /  AlkPhos  64  05-29    PT/INR - ( 28 May 2024 07:02 )   PT: 11.3 sec;   INR: 1.08 ratio         PTT - ( 28 May 2024 07:02 )  PTT:37.8 sec  CARDIAC MARKERS ( 27 May 2024 10:45 )  1766 ng/L / x     / x     / x     / x     / 2.6 ng/mL  CARDIAC MARKERS ( 24 May 2024 12:31 )  1978 ng/L / x     / x     / 619 U/L / x     / 41.2 ng/mL

## 2024-05-29 NOTE — PROGRESS NOTE ADULT - PROBLEM SELECTOR PLAN 3
A1C this admission 8.4      - Insulin Sliding Scale   - Lantus 42u qHS and admelog 13/12/10 units pre-meals  - Hold home medications  - Endocrine following

## 2024-05-29 NOTE — PROGRESS NOTE ADULT - PROBLEM SELECTOR PLAN 4
Cr baseline appears to be around 2.2     - Monitor I/Os  - Trend BMP   - C/w Lasix 40 qdaily to maintain euvolemia

## 2024-05-29 NOTE — PROGRESS NOTE ADULT - PROVIDER SPECIALTY LIST ADULT
Intervent Cardiology
Endocrinology
ISIS
Cardiology
Endocrinology
ISIS
Internal Medicine

## 2024-05-30 PROBLEM — Z00.00 ENCOUNTER FOR PREVENTIVE HEALTH EXAMINATION: Status: ACTIVE | Noted: 2024-05-30
